# Patient Record
Sex: MALE | Race: WHITE | Employment: OTHER | URBAN - METROPOLITAN AREA
[De-identification: names, ages, dates, MRNs, and addresses within clinical notes are randomized per-mention and may not be internally consistent; named-entity substitution may affect disease eponyms.]

---

## 2017-12-15 RX ORDER — FINASTERIDE 5 MG/1
5 TABLET, FILM COATED ORAL DAILY
COMMUNITY
End: 2020-12-24

## 2018-01-04 ENCOUNTER — HOSPITAL ENCOUNTER (OUTPATIENT)
Dept: ENDOSCOPY | Age: 78
Discharge: HOME | End: 2018-01-04
Attending: INTERNAL MEDICINE | Admitting: INTERNAL MEDICINE

## 2018-01-04 RX ORDER — SODIUM CHLORIDE 0.9 % (FLUSH) 0.9 %
10 SYRINGE (ML) INJECTION PRN
Status: DISCONTINUED | OUTPATIENT
Start: 2018-01-04 | End: 2018-01-05 | Stop reason: HOSPADM

## 2018-01-04 RX ORDER — SODIUM CHLORIDE 9 MG/ML
INJECTION, SOLUTION INTRAVENOUS CONTINUOUS
Status: DISCONTINUED | OUTPATIENT
Start: 2018-01-04 | End: 2018-01-05 | Stop reason: HOSPADM

## 2018-01-04 RX ORDER — LIDOCAINE HYDROCHLORIDE 10 MG/ML
1 INJECTION, SOLUTION EPIDURAL; INFILTRATION; INTRACAUDAL; PERINEURAL
Status: ACTIVE | OUTPATIENT
Start: 2018-01-04 | End: 2018-01-04

## 2018-01-04 RX ORDER — SODIUM CHLORIDE 0.9 % (FLUSH) 0.9 %
10 SYRINGE (ML) INJECTION EVERY 12 HOURS SCHEDULED
Status: DISCONTINUED | OUTPATIENT
Start: 2018-01-04 | End: 2018-01-05 | Stop reason: HOSPADM

## 2018-01-04 NOTE — ANESTHESIA POST-OP
Anesthesia Post-op Note    Patient: Marina Guadalupe  MRN: [id number]  YOB: 1940  Date of evaluation: 1/4/2018  Time:  11:18 AM     Procedure(s) Performed: endo    Last Vitals: There were no vitals taken for this visit.     Dina Phase I:      Dina Phase II:      Anesthesia Post Evaluation    Final anesthesia type: TIVA  Patient location during evaluation: PACU  Patient participation: complete - patient participated  Level of consciousness: awake and alert  Airway patency: patent  Nausea & Vomiting: no vomiting  Complications: no  Cardiovascular status: hemodynamically stable  Respiratory status: acceptable  Hydration status: euvolemic        Brittni Lawrence MD  11:18 AM

## 2019-11-20 ENCOUNTER — HOSPITAL ENCOUNTER (OUTPATIENT)
Age: 79
Setting detail: OUTPATIENT SURGERY
Discharge: HOME | End: 2019-11-20
Attending: PLASTIC SURGERY | Admitting: PLASTIC SURGERY
Payer: MEDICARE

## 2019-11-20 ENCOUNTER — ANESTHESIA EVENT (OUTPATIENT)
Dept: OPERATING ROOM | Age: 79
End: 2019-11-20
Payer: MEDICARE

## 2019-11-20 ENCOUNTER — ANESTHESIA (OUTPATIENT)
Dept: OPERATING ROOM | Age: 79
End: 2019-11-20
Payer: MEDICARE

## 2019-11-20 VITALS
RESPIRATION RATE: 18 BRPM | HEART RATE: 61 BPM | DIASTOLIC BLOOD PRESSURE: 59 MMHG | BODY MASS INDEX: 31.88 KG/M2 | TEMPERATURE: 97.2 F | SYSTOLIC BLOOD PRESSURE: 122 MMHG | HEIGHT: 74 IN | OXYGEN SATURATION: 95 % | WEIGHT: 248.38 LBS

## 2019-11-20 VITALS — SYSTOLIC BLOOD PRESSURE: 124 MMHG | DIASTOLIC BLOOD PRESSURE: 59 MMHG | TEMPERATURE: 96.1 F | OXYGEN SATURATION: 100 %

## 2019-11-20 DIAGNOSIS — C44.311 BASAL CELL CARCINOMA OF NOSE: Primary | ICD-10-CM

## 2019-11-20 PROCEDURE — 6370000000 HC RX 637 (ALT 250 FOR IP): Performed by: PLASTIC SURGERY

## 2019-11-20 PROCEDURE — 7100000000 HC PACU RECOVERY - FIRST 15 MIN: Performed by: PLASTIC SURGERY

## 2019-11-20 PROCEDURE — 6360000002 HC RX W HCPCS: Performed by: PLASTIC SURGERY

## 2019-11-20 PROCEDURE — 88331 PATH CONSLTJ SURG 1 BLK 1SPC: CPT

## 2019-11-20 PROCEDURE — 3600000012 HC SURGERY LEVEL 2 ADDTL 15MIN: Performed by: PLASTIC SURGERY

## 2019-11-20 PROCEDURE — 7100000011 HC PHASE II RECOVERY - ADDTL 15 MIN: Performed by: PLASTIC SURGERY

## 2019-11-20 PROCEDURE — 6360000002 HC RX W HCPCS: Performed by: NURSE ANESTHETIST, CERTIFIED REGISTERED

## 2019-11-20 PROCEDURE — 2580000003 HC RX 258: Performed by: PLASTIC SURGERY

## 2019-11-20 PROCEDURE — 2709999900 HC NON-CHARGEABLE SUPPLY: Performed by: PLASTIC SURGERY

## 2019-11-20 PROCEDURE — 3700000001 HC ADD 15 MINUTES (ANESTHESIA): Performed by: PLASTIC SURGERY

## 2019-11-20 PROCEDURE — 3600000002 HC SURGERY LEVEL 2 BASE: Performed by: PLASTIC SURGERY

## 2019-11-20 PROCEDURE — 2500000003 HC RX 250 WO HCPCS: Performed by: PLASTIC SURGERY

## 2019-11-20 PROCEDURE — 88305 TISSUE EXAM BY PATHOLOGIST: CPT

## 2019-11-20 PROCEDURE — 7100000001 HC PACU RECOVERY - ADDTL 15 MIN: Performed by: PLASTIC SURGERY

## 2019-11-20 PROCEDURE — 7100000010 HC PHASE II RECOVERY - FIRST 15 MIN: Performed by: PLASTIC SURGERY

## 2019-11-20 PROCEDURE — 3700000000 HC ANESTHESIA ATTENDED CARE: Performed by: PLASTIC SURGERY

## 2019-11-20 PROCEDURE — 2500000003 HC RX 250 WO HCPCS: Performed by: NURSE ANESTHETIST, CERTIFIED REGISTERED

## 2019-11-20 RX ORDER — SODIUM CHLORIDE 0.9 % (FLUSH) 0.9 %
10 SYRINGE (ML) INJECTION EVERY 12 HOURS SCHEDULED
Status: CANCELLED | OUTPATIENT
Start: 2019-11-20

## 2019-11-20 RX ORDER — FENTANYL CITRATE 50 UG/ML
25 INJECTION, SOLUTION INTRAMUSCULAR; INTRAVENOUS EVERY 5 MIN PRN
Status: DISCONTINUED | OUTPATIENT
Start: 2019-11-20 | End: 2019-11-20 | Stop reason: HOSPADM

## 2019-11-20 RX ORDER — BALANCED SALT SOLUTION 6.4; .75; .48; .3; 3.9; 1.7 MG/ML; MG/ML; MG/ML; MG/ML; MG/ML; MG/ML
SOLUTION OPHTHALMIC
Status: COMPLETED | OUTPATIENT
Start: 2019-11-20 | End: 2019-11-20

## 2019-11-20 RX ORDER — LABETALOL HYDROCHLORIDE 5 MG/ML
5 INJECTION, SOLUTION INTRAVENOUS EVERY 10 MIN PRN
Status: DISCONTINUED | OUTPATIENT
Start: 2019-11-20 | End: 2019-11-20 | Stop reason: HOSPADM

## 2019-11-20 RX ORDER — CEFAZOLIN SODIUM 2 G/100ML
2 INJECTION, SOLUTION INTRAVENOUS
Status: COMPLETED | OUTPATIENT
Start: 2019-11-20 | End: 2019-11-20

## 2019-11-20 RX ORDER — FENTANYL CITRATE 50 UG/ML
50 INJECTION, SOLUTION INTRAMUSCULAR; INTRAVENOUS EVERY 5 MIN PRN
Status: DISCONTINUED | OUTPATIENT
Start: 2019-11-20 | End: 2019-11-20 | Stop reason: HOSPADM

## 2019-11-20 RX ORDER — HYDROMORPHONE HCL 110MG/55ML
0.25 PATIENT CONTROLLED ANALGESIA SYRINGE INTRAVENOUS EVERY 5 MIN PRN
Status: DISCONTINUED | OUTPATIENT
Start: 2019-11-20 | End: 2019-11-20 | Stop reason: HOSPADM

## 2019-11-20 RX ORDER — HYDROMORPHONE HCL 110MG/55ML
0.5 PATIENT CONTROLLED ANALGESIA SYRINGE INTRAVENOUS EVERY 5 MIN PRN
Status: DISCONTINUED | OUTPATIENT
Start: 2019-11-20 | End: 2019-11-20 | Stop reason: HOSPADM

## 2019-11-20 RX ORDER — SODIUM CHLORIDE, SODIUM LACTATE, POTASSIUM CHLORIDE, CALCIUM CHLORIDE 600; 310; 30; 20 MG/100ML; MG/100ML; MG/100ML; MG/100ML
INJECTION, SOLUTION INTRAVENOUS CONTINUOUS
Status: DISCONTINUED | OUTPATIENT
Start: 2019-11-20 | End: 2019-11-20 | Stop reason: HOSPADM

## 2019-11-20 RX ORDER — LIDOCAINE HYDROCHLORIDE AND EPINEPHRINE 10; 10 MG/ML; UG/ML
INJECTION, SOLUTION INFILTRATION; PERINEURAL
Status: COMPLETED | OUTPATIENT
Start: 2019-11-20 | End: 2019-11-20

## 2019-11-20 RX ORDER — PROPOFOL 10 MG/ML
INJECTION, EMULSION INTRAVENOUS PRN
Status: DISCONTINUED | OUTPATIENT
Start: 2019-11-20 | End: 2019-11-20 | Stop reason: SDUPTHER

## 2019-11-20 RX ORDER — LIDOCAINE HYDROCHLORIDE 10 MG/ML
0.5 INJECTION, SOLUTION EPIDURAL; INFILTRATION; INTRACAUDAL; PERINEURAL ONCE
Status: DISCONTINUED | OUTPATIENT
Start: 2019-11-20 | End: 2019-11-20 | Stop reason: HOSPADM

## 2019-11-20 RX ORDER — ONDANSETRON 2 MG/ML
4 INJECTION INTRAMUSCULAR; INTRAVENOUS
Status: DISCONTINUED | OUTPATIENT
Start: 2019-11-20 | End: 2019-11-20 | Stop reason: HOSPADM

## 2019-11-20 RX ORDER — HYDROCODONE BITARTRATE AND ACETAMINOPHEN 5; 325 MG/1; MG/1
1 TABLET ORAL EVERY 4 HOURS PRN
Qty: 20 TABLET | Refills: 0 | Status: SHIPPED | OUTPATIENT
Start: 2019-11-20 | End: 2019-11-27

## 2019-11-20 RX ORDER — LIDOCAINE HYDROCHLORIDE 20 MG/ML
INJECTION, SOLUTION EPIDURAL; INFILTRATION; INTRACAUDAL; PERINEURAL PRN
Status: DISCONTINUED | OUTPATIENT
Start: 2019-11-20 | End: 2019-11-20 | Stop reason: SDUPTHER

## 2019-11-20 RX ORDER — PROPOFOL 10 MG/ML
INJECTION, EMULSION INTRAVENOUS CONTINUOUS PRN
Status: DISCONTINUED | OUTPATIENT
Start: 2019-11-20 | End: 2019-11-20 | Stop reason: SDUPTHER

## 2019-11-20 RX ORDER — SODIUM CHLORIDE, SODIUM LACTATE, POTASSIUM CHLORIDE, CALCIUM CHLORIDE 600; 310; 30; 20 MG/100ML; MG/100ML; MG/100ML; MG/100ML
INJECTION, SOLUTION INTRAVENOUS CONTINUOUS
Status: CANCELLED | OUTPATIENT
Start: 2019-11-20

## 2019-11-20 RX ORDER — SODIUM CHLORIDE 9 MG/ML
INJECTION, SOLUTION INTRAVENOUS CONTINUOUS
Status: CANCELLED | OUTPATIENT
Start: 2019-11-20

## 2019-11-20 RX ORDER — SODIUM CHLORIDE 0.9 % (FLUSH) 0.9 %
10 SYRINGE (ML) INJECTION PRN
Status: CANCELLED | OUTPATIENT
Start: 2019-11-20

## 2019-11-20 RX ADMIN — PROPOFOL 10 MG: 10 INJECTION, EMULSION INTRAVENOUS at 11:03

## 2019-11-20 RX ADMIN — PROPOFOL 100 MCG/KG/MIN: 10 INJECTION, EMULSION INTRAVENOUS at 10:52

## 2019-11-20 RX ADMIN — SODIUM CHLORIDE, POTASSIUM CHLORIDE, SODIUM LACTATE AND CALCIUM CHLORIDE: 600; 310; 30; 20 INJECTION, SOLUTION INTRAVENOUS at 09:55

## 2019-11-20 RX ADMIN — LIDOCAINE HYDROCHLORIDE 60 MG: 20 INJECTION, SOLUTION EPIDURAL; INFILTRATION; INTRACAUDAL; PERINEURAL at 10:52

## 2019-11-20 RX ADMIN — PROPOFOL 50 MG: 10 INJECTION, EMULSION INTRAVENOUS at 10:52

## 2019-11-20 RX ADMIN — PROPOFOL 20 MG: 10 INJECTION, EMULSION INTRAVENOUS at 10:59

## 2019-11-20 RX ADMIN — PROPOFOL 20 MG: 10 INJECTION, EMULSION INTRAVENOUS at 10:55

## 2019-11-20 RX ADMIN — CEFAZOLIN SODIUM 2 G: 2 INJECTION, SOLUTION INTRAVENOUS at 10:41

## 2019-11-20 ASSESSMENT — PAIN - FUNCTIONAL ASSESSMENT: PAIN_FUNCTIONAL_ASSESSMENT: 0-10

## 2019-11-20 ASSESSMENT — PULMONARY FUNCTION TESTS
PIF_VALUE: 0
PIF_VALUE: 1
PIF_VALUE: 0
PIF_VALUE: 1
PIF_VALUE: 0
PIF_VALUE: 1
PIF_VALUE: 1
PIF_VALUE: 0
PIF_VALUE: 1
PIF_VALUE: 1
PIF_VALUE: 0
PIF_VALUE: 0
PIF_VALUE: 1
PIF_VALUE: 0
PIF_VALUE: 0
PIF_VALUE: 1
PIF_VALUE: 1
PIF_VALUE: 0
PIF_VALUE: 1
PIF_VALUE: 0

## 2020-12-22 NOTE — H&P
[address]                     [facility], [address]                       PREOPERATIVE HISTORY AND PHYSICAL    PATIENT NAME: Nancy Quan                   :        1940  MED REC NO:   [id number]                          ROOM:  ACCOUNT NO:   [id number]                           ADMIT DATE: 2020  PROVIDER:     Madeleine Kenyon MD    DATE OF PLANNED PROCEDURE:  2020    DIAGNOSIS:  Squamous cell carcinoma, right ear. PLANNED PROCEDURE:  Wedge excision, frozen section, and possible full  thickness skin graft. INDICATION:  A 51-year-old black male presented with an obvious squamous  cell carcinoma of the right ear. After discussing options at length,  elected to proceed with definitive excision, frozen section repair with  a full thickness skin graft. The issue of bleeding, infection, scarring  as well as the issue of recurrence and total or partial loss of graft  itself was discussed and consent was obtained. PAST MEDICAL HISTORY:  Significant for high blood pressure. MEDICATIONS:  Currently takes omeprazole and a statin. ALLERGIES:  Denies any drug allergies. PHYSICAL EXAMINATION:  GENERAL:  Reveals a pleasant male, in no apparent distress. VITAL SIGNS:  Stable. LUNGS:  Clear. HEART:  Regular rate and rhythm. Examination again reveals the obvious 1 cm squamous cell carcinoma of  the right ear without any cervical adenopathy. IMPRESSION AND PLAN:  The patient will undergo definitive excision. Again the issue of bleeding, infection, and scarring as well as the  issue of recurrence and total or partial loss of graft itself was  discussed and consent was obtained.       Ronald Parks MD    D: 2020 17:14:36       T: 2020 21:45:25     [id number]  Job#: [id number]     Doc#: [id number]

## 2020-12-23 ENCOUNTER — OFFICE VISIT (OUTPATIENT)
Dept: PRIMARY CARE CLINIC | Age: 80
End: 2020-12-23
Payer: MEDICARE

## 2020-12-23 PROCEDURE — G8421 BMI NOT CALCULATED: HCPCS | Performed by: NURSE PRACTITIONER

## 2020-12-23 PROCEDURE — G8428 CUR MEDS NOT DOCUMENT: HCPCS | Performed by: NURSE PRACTITIONER

## 2020-12-23 PROCEDURE — 99211 OFF/OP EST MAY X REQ PHY/QHP: CPT | Performed by: NURSE PRACTITIONER

## 2020-12-23 NOTE — PATIENT INSTRUCTIONS

## 2020-12-24 NOTE — PROGRESS NOTES
Name_______________________________________Printed:____________________  Date and time of surgery____12/29/2020   1400____________________Arrival Time:____1230   [facility]____________   1. The instructions given regarding when and if a patient needs to stop oral intake prior to surgery varies. Follow the specific instructions you were given                  _X__Nothing to eat or to drink after Midnight the night before.                             ____Endoscopy patient follow your DRS instructions-generally you will be doing a part of the prep after Midnight                   ____Carbo loading or ERAS instructions will be given to select patients-if you have been given those instructions -please do the following                           The evening before your surgery after dinner before midnight drink 40 ounces of gatorade. If you are diabetic use sugar free. The morning of surgery drink 40 ounces of water. This needs to be finished 3 hours prior to your surgery start time. 2. Take the following pills with a small sip of water on the morning of surgery____AMLODIPINE, OMEPRAZOLE______________________________________________                  Do not take blood pressure medications ending in pril or sartan the corbin prior to surgery or the morning of surgery_   3. Aspirin, Ibuprofen, Advil, Naproxen, Vitamin E and other Anti-inflammatory products and supplements should be stopped for 5 -7days before surgery or as directed by your physician. 4. Check with your Doctor regarding stopping Plavix, Coumadin,Eliquis, Lovenox,Effient,Pradaxa,Xarelto, Fragmin or other blood thinners and follow their instructions. 5. Do not smoke, and do not drink any alcoholic beverages 24 hours prior to surgery. This includes NA Beer. Refrain from the usage of any recreational drugs. 6. You may brush your teeth and gargle the morning of surgery. DO NOT SWALLOW WATER   7.  You MUST make arrangements for a responsible adult to stay on site while you are here and take you home after your surgery. You will not be allowed to leave alone or drive yourself home. It is strongly suggested someone stay with you the first 24 hrs. Your surgery will be cancelled if you do not have a ride home. 8. A parent/legal guardian must accompany a child scheduled for surgery and plan to stay at the hospital until the child is discharged. Please do not bring other children with you. 9. Please wear simple, loose fitting clothing to the hospital.  [place] not bring valuables (money, credit cards, checkbooks, etc.) Do not wear any makeup (including no eye makeup) or nail polish on your fingers or toes. 10. DO NOT wear any jewelry or piercings on day of surgery. All body piercing jewelry must be removed. 11. If you have ___dentures, they will be removed before going to the OR; we will provide you a container. If you wear ___contact lenses or ___glasses, they will be removed; please bring a case for them. 12. Please see your family doctor/pediatrician for a history & physical and/or concerning medications. Bring any test results/reports from your physician's office. PCP__________________Phone___________H&P Appt. Date________             13 If you  have a Living Will and Durable Power of  for Healthcare, please bring in a copy. 15. Notify your Surgeon if you develop any illness between now and surgery  time, cough, cold, fever, sore throat, nausea, vomiting, etc.  Please notify your surgeon if you experience dizziness, shortness of breath or blurred vision between now & the time of your surgery             15. DO NOT shave your operative site 96 hours prior to surgery. For face & neck surgery, men may use an electric razor 48 hours prior to surgery. 16. Shower the night before or morning of surgery using an antibacterial soap or as you have been instructed.              17. To provide excellent care visitors will be limited to one in the room at any given time. 18.  Please bring picture ID and insurance card. 19.  Visit our web site for additional information:  [email or web address]              20.During flu season no children under the age of 15 are permitted in the hospital for the safety of all patients. 21. If you take a long acting insulin in the evening only  take half of your usual  dose the night  before your procedure              22. If you use a c-pap please bring DOS if staying overnight,             23.For your convenience [facility] has a pharmacy on site to fill your prescriptions. 24. If you use oxygen and have a portable tank please bring it  with you the DOS             25. Bring a complete list of all your medications with name and dose include any supplements. 26. Other__________________________________________   *Please call pre admission testing if you any further questions   [address]. [facility]  [phone]   [address] [place] [address]       All above information reviewed with patient in person or by phone. Patient verbalizes understanding. All questions and concerns addressed. Patient/Rep____PATIENT________________                                                                                                                                  There is a one visitor policy at [facility] for all surgeries and endoscopies. Whether the visitor can stay or will be asked to wait in the car will depend on the current policy and if social distancing can be maintained. The policy is subject to change at any time. Please make sure the visitor has a cell phone that is on,charged and able to accept calls, as this may be the way that the staff communicates with them. Pain management is NO VISITOR policyThe patients ride is expected to remain in the car with a cell phone for communication. If the ride is leaving the hospital grounds please make sure they are back in time for pickup. Have the patient inform the staff on arrival what their rides plans are while the patient is in the facility. At the MAIN there is one visitor allowed. Please note that the visitor policy is subject to change.   PRE OP INSTRUCTIONS

## 2020-12-25 LAB — SARS-COV-2, NAA: NOT DETECTED

## 2020-12-29 ENCOUNTER — ANESTHESIA EVENT (OUTPATIENT)
Dept: OPERATING ROOM | Age: 80
End: 2020-12-29
Payer: MEDICARE

## 2020-12-29 ENCOUNTER — HOSPITAL ENCOUNTER (OUTPATIENT)
Age: 80
Setting detail: OUTPATIENT SURGERY
Discharge: HOME | End: 2020-12-29
Attending: PLASTIC SURGERY | Admitting: PLASTIC SURGERY
Payer: MEDICARE

## 2020-12-29 ENCOUNTER — ANESTHESIA (OUTPATIENT)
Dept: OPERATING ROOM | Age: 80
End: 2020-12-29
Payer: MEDICARE

## 2020-12-29 VITALS
DIASTOLIC BLOOD PRESSURE: 66 MMHG | BODY MASS INDEX: 32.73 KG/M2 | OXYGEN SATURATION: 97 % | SYSTOLIC BLOOD PRESSURE: 129 MMHG | HEIGHT: 74 IN | HEART RATE: 69 BPM | RESPIRATION RATE: 12 BRPM | WEIGHT: 255 LBS | TEMPERATURE: 98.6 F

## 2020-12-29 VITALS
RESPIRATION RATE: 1 BRPM | SYSTOLIC BLOOD PRESSURE: 127 MMHG | DIASTOLIC BLOOD PRESSURE: 56 MMHG | OXYGEN SATURATION: 96 %

## 2020-12-29 PROCEDURE — 88305 TISSUE EXAM BY PATHOLOGIST: CPT

## 2020-12-29 PROCEDURE — 3700000001 HC ADD 15 MINUTES (ANESTHESIA): Performed by: PLASTIC SURGERY

## 2020-12-29 PROCEDURE — 7100000010 HC PHASE II RECOVERY - FIRST 15 MIN: Performed by: PLASTIC SURGERY

## 2020-12-29 PROCEDURE — 7100000011 HC PHASE II RECOVERY - ADDTL 15 MIN: Performed by: PLASTIC SURGERY

## 2020-12-29 PROCEDURE — 6370000000 HC RX 637 (ALT 250 FOR IP): Performed by: PLASTIC SURGERY

## 2020-12-29 PROCEDURE — 6360000002 HC RX W HCPCS: Performed by: NURSE ANESTHETIST, CERTIFIED REGISTERED

## 2020-12-29 PROCEDURE — 3700000000 HC ANESTHESIA ATTENDED CARE: Performed by: PLASTIC SURGERY

## 2020-12-29 PROCEDURE — 3600000012 HC SURGERY LEVEL 2 ADDTL 15MIN: Performed by: PLASTIC SURGERY

## 2020-12-29 PROCEDURE — 88331 PATH CONSLTJ SURG 1 BLK 1SPC: CPT

## 2020-12-29 PROCEDURE — 3600000002 HC SURGERY LEVEL 2 BASE: Performed by: PLASTIC SURGERY

## 2020-12-29 PROCEDURE — 2500000003 HC RX 250 WO HCPCS: Performed by: NURSE ANESTHETIST, CERTIFIED REGISTERED

## 2020-12-29 PROCEDURE — 2580000003 HC RX 258: Performed by: PLASTIC SURGERY

## 2020-12-29 PROCEDURE — 6360000002 HC RX W HCPCS: Performed by: PLASTIC SURGERY

## 2020-12-29 PROCEDURE — 2709999900 HC NON-CHARGEABLE SUPPLY: Performed by: PLASTIC SURGERY

## 2020-12-29 PROCEDURE — 2500000003 HC RX 250 WO HCPCS: Performed by: PLASTIC SURGERY

## 2020-12-29 RX ORDER — PROPOFOL 10 MG/ML
INJECTION, EMULSION INTRAVENOUS PRN
Status: DISCONTINUED | OUTPATIENT
Start: 2020-12-29 | End: 2020-12-29 | Stop reason: SDUPTHER

## 2020-12-29 RX ORDER — ONDANSETRON 2 MG/ML
4 INJECTION INTRAMUSCULAR; INTRAVENOUS
Status: DISCONTINUED | OUTPATIENT
Start: 2020-12-29 | End: 2020-12-29 | Stop reason: HOSPADM

## 2020-12-29 RX ORDER — HYDROCODONE BITARTRATE AND ACETAMINOPHEN 5; 325 MG/1; MG/1
1 TABLET ORAL
Status: DISCONTINUED | OUTPATIENT
Start: 2020-12-29 | End: 2020-12-29 | Stop reason: HOSPADM

## 2020-12-29 RX ORDER — LIDOCAINE HYDROCHLORIDE 10 MG/ML
1 INJECTION, SOLUTION EPIDURAL; INFILTRATION; INTRACAUDAL; PERINEURAL
Status: CANCELLED | OUTPATIENT
Start: 2020-12-29 | End: 2020-12-29

## 2020-12-29 RX ORDER — LIDOCAINE HYDROCHLORIDE AND EPINEPHRINE 10; 10 MG/ML; UG/ML
INJECTION, SOLUTION INFILTRATION; PERINEURAL
Status: COMPLETED | OUTPATIENT
Start: 2020-12-29 | End: 2020-12-29

## 2020-12-29 RX ORDER — MINERAL OIL 471.99 G/472ML
OIL TOPICAL
Status: COMPLETED | OUTPATIENT
Start: 2020-12-29 | End: 2020-12-29

## 2020-12-29 RX ORDER — HYDROCODONE BITARTRATE AND ACETAMINOPHEN 5; 325 MG/1; MG/1
1 TABLET ORAL EVERY 4 HOURS PRN
Qty: 20 TABLET | Refills: 0 | Status: SHIPPED | OUTPATIENT
Start: 2020-12-29 | End: 2021-01-05

## 2020-12-29 RX ORDER — SODIUM CHLORIDE 9 MG/ML
INJECTION, SOLUTION INTRAVENOUS CONTINUOUS
Status: CANCELLED | OUTPATIENT
Start: 2020-12-29

## 2020-12-29 RX ORDER — HYDROMORPHONE HCL 110MG/55ML
0.25 PATIENT CONTROLLED ANALGESIA SYRINGE INTRAVENOUS EVERY 5 MIN PRN
Status: DISCONTINUED | OUTPATIENT
Start: 2020-12-29 | End: 2020-12-29 | Stop reason: HOSPADM

## 2020-12-29 RX ORDER — CEPHALEXIN 500 MG/1
500 CAPSULE ORAL 4 TIMES DAILY
Qty: 28 CAPSULE | Refills: 0 | Status: SHIPPED | OUTPATIENT
Start: 2020-12-29 | End: 2021-01-05

## 2020-12-29 RX ORDER — LIDOCAINE HYDROCHLORIDE 20 MG/ML
INJECTION, SOLUTION INFILTRATION; PERINEURAL PRN
Status: DISCONTINUED | OUTPATIENT
Start: 2020-12-29 | End: 2020-12-29 | Stop reason: SDUPTHER

## 2020-12-29 RX ORDER — LIDOCAINE HYDROCHLORIDE 10 MG/ML
0.5 INJECTION, SOLUTION EPIDURAL; INFILTRATION; INTRACAUDAL; PERINEURAL ONCE
Status: DISCONTINUED | OUTPATIENT
Start: 2020-12-29 | End: 2020-12-29 | Stop reason: HOSPADM

## 2020-12-29 RX ORDER — SODIUM CHLORIDE, SODIUM LACTATE, POTASSIUM CHLORIDE, CALCIUM CHLORIDE 600; 310; 30; 20 MG/100ML; MG/100ML; MG/100ML; MG/100ML
INJECTION, SOLUTION INTRAVENOUS CONTINUOUS
Status: DISCONTINUED | OUTPATIENT
Start: 2020-12-29 | End: 2020-12-29 | Stop reason: HOSPADM

## 2020-12-29 RX ADMIN — LIDOCAINE HYDROCHLORIDE 100 MG: 20 INJECTION, SOLUTION INFILTRATION; PERINEURAL at 13:52

## 2020-12-29 RX ADMIN — SODIUM CHLORIDE, POTASSIUM CHLORIDE, SODIUM LACTATE AND CALCIUM CHLORIDE: 600; 310; 30; 20 INJECTION, SOLUTION INTRAVENOUS at 13:25

## 2020-12-29 RX ADMIN — CEFAZOLIN SODIUM 2 G: 10 INJECTION, POWDER, FOR SOLUTION INTRAVENOUS at 13:47

## 2020-12-29 RX ADMIN — PROPOFOL 60 MG: 10 INJECTION, EMULSION INTRAVENOUS at 13:53

## 2020-12-29 RX ADMIN — PROPOFOL 20 MG: 10 INJECTION, EMULSION INTRAVENOUS at 13:59

## 2020-12-29 RX ADMIN — PROPOFOL 20 MG: 10 INJECTION, EMULSION INTRAVENOUS at 14:13

## 2020-12-29 ASSESSMENT — PULMONARY FUNCTION TESTS
PIF_VALUE: 1
PIF_VALUE: 0
PIF_VALUE: 1
PIF_VALUE: 0
PIF_VALUE: 1
PIF_VALUE: 0
PIF_VALUE: 1
PIF_VALUE: 0
PIF_VALUE: 0
PIF_VALUE: 1
PIF_VALUE: 0
PIF_VALUE: 1
PIF_VALUE: 0
PIF_VALUE: 0
PIF_VALUE: 1
PIF_VALUE: 0
PIF_VALUE: 1
PIF_VALUE: 1
PIF_VALUE: 0
PIF_VALUE: 0
PIF_VALUE: 1
PIF_VALUE: 0
PIF_VALUE: 1
PIF_VALUE: 0
PIF_VALUE: 1
PIF_VALUE: 0
PIF_VALUE: 1
PIF_VALUE: 0
PIF_VALUE: 1
PIF_VALUE: 0
PIF_VALUE: 1
PIF_VALUE: 0
PIF_VALUE: 0
PIF_VALUE: 1

## 2020-12-29 ASSESSMENT — PAIN - FUNCTIONAL ASSESSMENT: PAIN_FUNCTIONAL_ASSESSMENT: 0-10

## 2020-12-29 NOTE — H&P
I have reviewed the history and physical and examined the patient and find no relevant changes. I have reviewed with the patient and/or family the risks, benefits, and alternatives to the procedure. Patient has no known allergies. Last recorded vitals:  BP (!) 143/67   Pulse 77   Temp 97.5 °F (36.4 °C) (Temporal)   Resp 16   Ht 6' 2\" (1.88 m)   Wt 255 lb (115.7 kg)   SpO2 97%   BMI 32.74 kg/m²     Past Surgical History:   Procedure Laterality Date    ANKLE FUSION Right 2012    CYSTOSCOPY      MULTIPLE FOR BLADDER CA.   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  Authorizing Provider   amLODIPine (NORVASC) 10 MG tablet Take 10 mg by mouth daily  12/26/14  Yes Historical Provider, MD   atorvastatin (LIPITOR) 20 MG tablet Take 5 mg by mouth nightly  12/12/14  Yes Historical Provider, MD   omeprazole (PRILOSEC) 20 MG capsule Take 20 mg by mouth 2 times daily  2/5/15  Yes Historical Provider, MD   torsemide (DEMADEX) 5 MG tablet Take 5 mg by mouth daily  1/19/15  Yes Historical Provider, MD   calcium carbonate 600 MG TABS tablet Take 1 tablet by mouth 2 times daily as needed    Yes Historical Provider, MD   B Complex Vitamins (VITAMIN B COMPLEX PO) Take by mouth daily    Yes Historical Provider, MD         Current Facility-Administered Medications:     lactated ringers infusion, , Intravenous, Continuous, Armaan Mendoza MD, Last Rate: 50 mL/hr at 12/29/20 1325, New Bag at 12/29/20 1325    lidocaine PF 1 % injection 0.5 mL, 0.5 mL, Intradermal, Once, Ishmael Rome MD    ceFAZolin (ANCEF) 2 g in dextrose 5 % 100 mL IVPB, 2 g, Intravenous, On Call to MD Karen Rutherford MD  12/29/2020

## 2020-12-29 NOTE — ANESTHESIA POSTPROCEDURE EVALUATION
Department of Anesthesiology  Postprocedure Note    Patient: Franklin Saldivar  MRN: [id number]  YOB: 1940  Date of evaluation: 12/29/2020  Time:  4:20 PM     Procedure Summary     Date: 12/29/20 Room / Location: [address]    Anesthesia Start: 1393 Anesthesia Stop: 3065    Procedure: WIDE EXCISION SQUAMOUS CELL CARCINOMA RIGHT EAR; FROZEN SECTION, FULL THICKNESS SKINGRAFT (Right Ear) Diagnosis: (C44.212  SQUAMOUS CELL CARCINOMA RIGHT EAR)    Surgeons: Irlanda Hernandez MD Responsible Provider: Jim Soto MD    Anesthesia Type: MAC ASA Status: 3          Anesthesia Type: MAC    Dina Phase I:      Dina Phase II: Dina Score: 10    Last vitals: Reviewed and per EMR flowsheets.        Anesthesia Post Evaluation    Patient location during evaluation: PACU  Patient participation: complete - patient participated  Level of consciousness: awake  Airway patency: patent  Nausea & Vomiting: no vomiting  Complications: no  Cardiovascular status: hemodynamically stable  Respiratory status: acceptable  Hydration status: euvolemic

## 2020-12-29 NOTE — PROGRESS NOTES
Pt arrived from OR, pt awake and alert/ talking, pt in phase 2 upon arrival to pacu. VSS. Respirations even unlabored. Right ear dressing clean dry and intact.

## 2020-12-29 NOTE — ANESTHESIA PRE PROCEDURE
Department of Anesthesiology  Preprocedure Note       Name:  Mino Burr   Age:  [de-identified] y.o.  :  1940                                          MRN:  [id number]         Date:  2020      Surgeon: Leta Palafox): Leatha Zee MD    Procedure: Procedure(s):  WIDE EXCISION SQUAMOUS CELL CARCINOMA RIGHT EAR; FROZEN SECTION  FULL THICKNESS SKIN GRAFT    Medications prior to admission:   Prior to Admission medications    Medication Sig Start Date End Date Taking? Authorizing Provider   HYDROcodone-acetaminophen (NORCO) 5-325 MG per tablet Take 1 tablet by mouth every 4 hours as needed for Pain for up to 7 days. Intended supply: 3 days.  Take lowest dose possible to manage pain 20 Yes Leatha Zee MD   cephALEXin (KEFLEX) 500 MG capsule Take 1 capsule by mouth 4 times daily for 7 days 20 Yes Leatha Zee MD   amLODIPine (NORVASC) 10 MG tablet Take 10 mg by mouth daily  14  Yes Historical Provider, MD   atorvastatin (LIPITOR) 20 MG tablet Take 5 mg by mouth nightly  14  Yes Historical Provider, MD   omeprazole (PRILOSEC) 20 MG capsule Take 20 mg by mouth 2 times daily  2/5/15  Yes Historical Provider, MD   torsemide (DEMADEX) 5 MG tablet Take 5 mg by mouth daily  1/19/15  Yes Historical Provider, MD   calcium carbonate 600 MG TABS tablet Take 1 tablet by mouth 2 times daily as needed    Yes Historical Provider, MD   B Complex Vitamins (VITAMIN B COMPLEX PO) Take by mouth daily    Yes Historical Provider, MD       Current medications:    Current Facility-Administered Medications   Medication Dose Route Frequency Provider Last Rate Last Admin    lactated ringers infusion   Intravenous Continuous Armaan Mendoza MD 50 mL/hr at 20 1325 New Bag at 20 1325    lidocaine PF 1 % injection 0.5 mL  0.5 mL Intradermal Once Armaan Mendoza MD        ceFAZolin (ANCEF) 2 g in dextrose 5 % 100 mL IVPB  2 g Intravenous On Call to [address],  mL/hr at 20 1347 2 g at 20 1347    HYDROcodone-acetaminophen (NORCO) 5-325 MG per tablet 1 tablet  1 tablet Oral Once PRN Ana Gardner MD        ondansetron [facility]) injection 4 mg  4 mg Intravenous Once PRN Ana Gardner MD        HYDROmorphone (DILAUDID) injection 0.25 mg  0.25 mg Intravenous Q5 Min PRN Ana Gardner MD           Allergies:  No Known Allergies    Problem List:    Patient Active Problem List   Diagnosis Code    Peripheral neuropathy G62.9       Past Medical History:        Diagnosis Date    Tracey esophagus     Bladder cancer ([facility] [address] 2010    DJD (degenerative joint disease)     HTN (hypertension)     Hyperlipidemia     Neuropathy 01/2015       Past Surgical History:        Procedure Laterality Date    ANKLE FUSION Right 2012    CYSTOSCOPY      MULTIPLE FOR BLADDER CA.    FACIAL COSMETIC SURGERY Right 11/20/2019    EXCISION BASAL CELL CARCINOMA RIGHT CHEEK performed [facility] Fly Benito [facility] at [facility]      NOSE SURGERY N/A 11/20/2019    EXCISION BASAL CELL CARCINOMA FROM NASAL TIP WITH FROZEN SECTION, BILOBED FLAP performed by Fly Benito MD at [address] [place] [address]       Social History:    Social History     Tobacco Use    Smoking status: Never Smoker    Smokeless tobacco: Never Used   Substance Use Topics    Alcohol use:  No                                Counseling given: Not Answered      Vital Signs (Current):   Vitals:    12/24/20 0859 12/29/20 1256 12/29/20 1258   BP:   (!) 143/67   Pulse:   77   Resp:   16   Temp:   97.5 °F (36.4 °C)   TempSrc:   Temporal   SpO2:   97%   Weight: 255 lb (115.7 kg) 255 lb (115.7 kg)    Height: 6' 2\" (1.88 m) 6' 2\" (1.88 m)                                               BP Readings from Last 3 Encounters:   12/29/20 (!) 143/67   11/20/19 (!) 122/59   11/20/19 (!) 124/59       NPO Status: Time of last liquid consumption: 2330                        Time of last solid consumption: 2330 Date of last liquid consumption: 12/28/20                        Date of last solid food consumption: 12/28/20    BMI:   Wt Readings from Last 3 Encounters:   12/29/20 255 lb (115.7 kg)   11/20/19 248 lb 6 oz (112.7 kg)   12/15/17 250 lb (113.4 kg)     Body mass index is 32.74 kg/m². CBC: No results found for: WBC, RBC, HGB, HCT, MCV, RDW, PLT    CMP:   Lab Results   Component Value Date    PROT 6.7 03/06/2015       POC Tests: No results for input(s): POCGLU, POCNA, POCK, POCCL, POCBUN, POCHEMO, POCHCT in the last 72 hours. Coags: No results found for: PROTIME, INR, APTT    HCG (If Applicable): No results found for: PREGTESTUR, PREGSERUM, HCG, HCGQUANT     ABGs: No results found for: PHART, PO2ART, USQ9XUJ, VQV9UKC, BEART, D2VYGVJI     Type & Screen (If Applicable):  No results found for: LABABO, LABRH    Drug/Infectious Status (If Applicable):  No results found for: HIV, HEPCAB    COVID-19 Screening (If Applicable):   Lab Results   Component Value Date    COVID19 NOT DETECTED 12/23/2020         Anesthesia Evaluation  Patient summary reviewed no history of anesthetic complications:   Airway: Mallampati: II  TM distance: >3 FB   Neck ROM: full  Mouth opening: > = 3 FB Dental: normal exam         Pulmonary: breath sounds clear to auscultation                             Cardiovascular:    (+) hypertension:, hyperlipidemia        Rhythm: regular  Rate: normal                    Neuro/Psych:   (+) neuromuscular disease:,              ROS comment: neuropathy GI/Hepatic/Renal:   (+) GERD (no gerd sx today or recently): well controlled,           Endo/Other:    (+) malignancy/cancer (bladder). Abdominal:           Vascular:                                        Anesthesia Plan      MAC     ASA 3     (Patient verbalizes understanding that there is the possibility of awareness and recall with MAC.   Patient verbalizes a desire to proceed with the planned anesthetic.)  Induction: intravenous. Anesthetic plan and risks discussed with patient. Plan discussed with CRNA.                   Karen Murphy MD   12/29/2020

## 2020-12-30 NOTE — OP NOTE
[facility]                     [facility], [address] [place] [address]                                OPERATIVE REPORT    PATIENT NAME: Ayde Suero                   :        1940  MED REC NO:   [id number]                          ROOM:  ACCOUNT NO:   [id number]                           ADMIT DATE: 2020  PROVIDER:     Willam Chirinos MD    DATE OF PROCEDURE:  2020    PREOPERATIVE DIAGNOSIS:  Squamous cell carcinoma, right helix. POSTOPERATIVE DIAGNOSIS:  Squamous cell carcinoma, right helix. OPERATION PERFORMED:  Excision of 1 cm of squamous cell carcinoma and  repair with a full-thickness skin graft. SURGEON:  Willam Chirinos MD    INDICATIONS:  This is an 25-year-old male, who presented in consultation  for an obvious squamous cell carcinoma. After once again discussing the  options at length, he elected to proceed with definitive excision,  frozen section repair using a full-thickness skin graft. Issue of  bleeding, infection, and scarring as well as the issue of recurrence and  total and partial loss of graft itself was discussed, and consent was  obtained. OPERATIVE PROCEDURE:  The patient was taken to the operating room on  , placed in supine position, at which time IV sedation provided. The area was infiltrated with 1% lidocaine with epinephrine 1:100,000,  scrubbed with some Betadine solution. The lesion was then excised and  the margins were positive on the deep margins and therefore I did  complete circumferential reexcision down to the underlying cartilage. I  tried to leave as much as perichondrium as possible. Hemostasis was  obtained with electrocautery. I did place a suture on the deep margins  for clarification. I then obtained a full-thickness skin graft from the  postauricular area. The donor site was closed after hemostasis using  5-0 chromic.     Skin graft was appropriately defatted, placed on the recipient site  using 5 Vicryl and a bolster dressing made of Xeroform and cotton balls  soaked in mineral oil was then placed and secured with a 5-0 Vicryl. The patient was then taken to the recovery room in satisfactory  condition. No complication were noted. At the end of the procedure,  sponge, needle, and instruments counts were entirely correct. Instructed to keep the head elevated all the times. Followup in one  week. I gave him script for Norco as well as Keflex.         Milana Velasquez MD    D: 12/29/2020 17:25:20       T: 12/29/2020 22:38:03     [id number]  Job#: [id number]     Doc#: [id number]    CC:

## 2022-10-07 NOTE — PROGRESS NOTES
Name_______________________________________Printed:____________________  Date and time of surgery____10/12/2022    0745____________________Arrival Time:_____0615   [facility] [place]___________   1. The instructions given regarding when and if a patient needs to stop oral intake prior to surgery varies. Follow the specific instructions you were given                  __X_Nothing to eat or to drink after Midnight the night before.                   ____Carbo loading or ERAS instructions will be given to select patients-if you have been given those instructions -please do the following                           The evening before your surgery after dinner before midnight drink 40 ounces of gatorade. If you are diabetic use sugar free. The morning of surgery drink 40 ounces of water. This needs to be finished 3 hours prior to your surgery start time. 2. Take the following pills with a small sip of water on the morning of surgery___AMLODIPINE, OMEPRAZOLE________________________________________________                  Do not take blood pressure medications ending in pril or sartan the corbin prior to surgery or the morning of surgery_   3. Aspirin, Ibuprofen, Advil, Naproxen, Vitamin E and other Anti-inflammatory products and supplements should be stopped for 5 -7days before surgery or as directed by your physician. 4. Check with your Doctor regarding stopping Plavix, Coumadin,Eliquis, Lovenox,Effient,Pradaxa,Xarelto, Fragmin or other blood thinners and follow their instructions. 5. Do not smoke, and do not drink any alcoholic beverages 24 hours prior to surgery. This includes NA Beer. Refrain from the usage of any recreational drugs. 6. You may brush your teeth and gargle the morning of surgery. DO NOT SWALLOW WATER   7. You MUST make arrangements for a responsible adult to stay on site while you are here and take you home after your surgery. You will not be allowed to leave alone or drive yourself home.   It is strongly suggested someone stay with you the first 24 hrs. Your surgery will be cancelled if you do not have a ride home. 8. A parent/legal guardian must accompany a child scheduled for surgery and plan to stay at the hospital until the child is discharged. Please do not bring other children with you. 9. Please wear simple, loose fitting clothing to the hospital.  Chad Puri not bring valuables (money, credit cards, checkbooks, etc.) Do not wear any makeup (including no eye makeup) or nail polish on your fingers or toes. 10. DO NOT wear any jewelry or piercings on day of surgery. All body piercing jewelry must be removed. 11. If you have ___dentures, they will be removed before going to the OR; we will provide you a container. If you wear ___contact lenses or ___glasses, they will be removed; please bring a case for them. 12. Please see your family doctor/pediatrician for a history & physical and/or concerning medications. Bring any test results/reports from your physician's office. PCP__________________Phone___________H&P Appt. Date________             13 If you  have a Living Will and Durable Power of  for Healthcare, please bring in a copy. 15. Notify your Surgeon if you develop any illness between now and surgery  time, cough, cold, fever, sore throat, nausea, vomiting, etc.  Please notify your surgeon if you experience dizziness, shortness of breath or blurred vision between now & the time of your surgery             15. DO NOT shave your operative site 96 hours prior to surgery. For face & neck surgery, men may use an electric razor 48 hours prior to surgery. 16. Shower the night before or morning of surgery using an antibacterial soap or as you have been instructed. 17. To provide excellent care visitors will be limited to one in the room at any given time. 18.  Please bring picture ID and insurance card.              19. Visit our web site for additional information:  [email or web address]-eprep              20.During flu season no children under the age of 15 are permitted in the hospital for the safety of all patients. 21. If you take a long acting insulin in the evening only  take half of your usual  dose the night  before your procedure              22. If you use a c-pap please bring DOS if staying overnight,             23.For your convenience [facility] has a pharmacy on site to fill your prescriptions. 24. If you use oxygen and have a portable tank please bring it  with you the DOS             25. Bring a complete list of all your medications with name and dose include any supplements. 26. Other__________________________________________   *Please call pre admission testing if you any further questions   Sonia Donaldson   [address]    [place] [phone]. [place]  [phone]   [address] [place] [address]       VISITOR POLICY(subject to change)    Current policy is 2 visitors per patient. No children. A mask is required. Visiting hours are 8a-8p. Overnight visitors will be at the discretion of the nurse. All above information reviewed with patient in person or by phone. Patient verbalizes understanding. All questions and concerns addressed.                                                                                                  Patient/Rep____PATIENT________________                                                                                                                                    PRE OP INSTRUCTIONS

## 2022-10-11 NOTE — H&P
[address]                     [facility], [address]                       PREOPERATIVE HISTORY AND PHYSICAL    PATIENT NAME: Valentín Barber                   :        1940  MED REC NO:   [id number]                          ROOM:  ACCOUNT NO:   [de-identified]                           ADMIT DATE: 10/12/2022  PROVIDER:     Gigi Heimlich, MD    PLANNED DATE OF PROCEDURE: 10/12/2022    PREOPERATIVE DIAGNOSIS:  Right ear squamous cell carcinoma. PLANNED PROCEDURE:  Wide excision, frozen section, and repair using a  full-thickness skin graft. INDICATIONS:  The patient is a 25-year-old white male seen in  consultation for an obvious squamous cell carcinoma of the right ear. After discussing the options at length, he elected to proceed with  definitive excision, frozen section, and repair using a full-thickness  skin graft. PAST MEDICAL HISTORY:  Significant for high blood pressure. He does  have a history blood cancer and bladder cancer. ALLERGIES:  Denies any drug allergies. MEDICATIONS:  Currently takes omeprazole, amlodipine, and a statin. SOCIAL HISTORY:  He is a nonsmoker. PHYSICAL EXAMINATION:  GENERAL:  Reveals a pleasant male, alert, in no apparent distress. VITAL SIGNS:  Stable. LUNGS:  Clear. HEART:  Regular rate and rhythm. SKIN:  Examination again reveals a 1-cm squamous cell carcinoma of the  right ear without any cervical adenopathy. IMPRESSION AND PLAN:  The patient is to undergo definitive wide  excision, frozen section, and repair using a full-thickness skin graft. The issue of bleeding, infection, wound dehiscence, and scarring as well  as the issue of recurrence and total or partial loss of graft itself was  discussed and consent was obtained.         Eric Meraz MD    D: 10/10/2022 13:20:27       T: 10/10/2022 15:55:38     [email or web address]  Job#: [id number]     Doc#: [id number]

## 2022-10-12 ENCOUNTER — HOSPITAL ENCOUNTER (OUTPATIENT)
Age: 82
Setting detail: OUTPATIENT SURGERY
Discharge: HOME | End: 2022-10-12
Attending: PLASTIC SURGERY | Admitting: PLASTIC SURGERY
Payer: MEDICARE

## 2022-10-12 ENCOUNTER — ANESTHESIA (OUTPATIENT)
Dept: OPERATING ROOM | Age: 82
End: 2022-10-12
Payer: MEDICARE

## 2022-10-12 ENCOUNTER — ANESTHESIA EVENT (OUTPATIENT)
Dept: OPERATING ROOM | Age: 82
End: 2022-10-12
Payer: MEDICARE

## 2022-10-12 VITALS
HEART RATE: 62 BPM | BODY MASS INDEX: 30.71 KG/M2 | SYSTOLIC BLOOD PRESSURE: 135 MMHG | DIASTOLIC BLOOD PRESSURE: 62 MMHG | WEIGHT: 247 LBS | OXYGEN SATURATION: 95 % | RESPIRATION RATE: 14 BRPM | HEIGHT: 75 IN | TEMPERATURE: 97.6 F

## 2022-10-12 DIAGNOSIS — C44.222 SQUAMOUS CELL CARCINOMA OF SKIN OF RIGHT EAR AND EXTERNAL AURICULAR CANAL: Primary | ICD-10-CM

## 2022-10-12 DIAGNOSIS — C44.222 SQUAMOUS CELL CARCINOMA OF SKIN OF EAR AND EXTERNAL AUDITORY CANAL, RIGHT: ICD-10-CM

## 2022-10-12 PROCEDURE — 88305 TISSUE EXAM BY PATHOLOGIST: CPT

## 2022-10-12 PROCEDURE — 6360000002 HC RX W HCPCS: Performed by: PLASTIC SURGERY

## 2022-10-12 PROCEDURE — 6360000002 HC RX W HCPCS: Performed by: NURSE ANESTHETIST, CERTIFIED REGISTERED

## 2022-10-12 PROCEDURE — 3600000002 HC SURGERY LEVEL 2 BASE: Performed by: PLASTIC SURGERY

## 2022-10-12 PROCEDURE — 7100000010 HC PHASE II RECOVERY - FIRST 15 MIN: Performed by: PLASTIC SURGERY

## 2022-10-12 PROCEDURE — 7100000011 HC PHASE II RECOVERY - ADDTL 15 MIN: Performed by: PLASTIC SURGERY

## 2022-10-12 PROCEDURE — 2500000003 HC RX 250 WO HCPCS: Performed by: PLASTIC SURGERY

## 2022-10-12 PROCEDURE — 6370000000 HC RX 637 (ALT 250 FOR IP): Performed by: PLASTIC SURGERY

## 2022-10-12 PROCEDURE — 2500000003 HC RX 250 WO HCPCS: Performed by: NURSE ANESTHETIST, CERTIFIED REGISTERED

## 2022-10-12 PROCEDURE — 88331 PATH CONSLTJ SURG 1 BLK 1SPC: CPT

## 2022-10-12 PROCEDURE — 3700000000 HC ANESTHESIA ATTENDED CARE: Performed by: PLASTIC SURGERY

## 2022-10-12 PROCEDURE — 2709999900 HC NON-CHARGEABLE SUPPLY: Performed by: PLASTIC SURGERY

## 2022-10-12 PROCEDURE — 2580000003 HC RX 258: Performed by: PLASTIC SURGERY

## 2022-10-12 PROCEDURE — 3600000012 HC SURGERY LEVEL 2 ADDTL 15MIN: Performed by: PLASTIC SURGERY

## 2022-10-12 PROCEDURE — 3700000001 HC ADD 15 MINUTES (ANESTHESIA): Performed by: PLASTIC SURGERY

## 2022-10-12 RX ORDER — CEPHALEXIN 500 MG/1
500 CAPSULE ORAL 4 TIMES DAILY
Qty: 28 CAPSULE | Refills: 0 | Status: SHIPPED | OUTPATIENT
Start: 2022-10-12 | End: 2022-10-19

## 2022-10-12 RX ORDER — SODIUM CHLORIDE 9 MG/ML
INJECTION, SOLUTION INTRAVENOUS PRN
Status: CANCELLED | OUTPATIENT
Start: 2022-10-12

## 2022-10-12 RX ORDER — ONDANSETRON 2 MG/ML
4 INJECTION INTRAMUSCULAR; INTRAVENOUS
Status: CANCELLED | OUTPATIENT
Start: 2022-10-12 | End: 2022-10-13

## 2022-10-12 RX ORDER — PROPOFOL 10 MG/ML
INJECTION, EMULSION INTRAVENOUS PRN
Status: DISCONTINUED | OUTPATIENT
Start: 2022-10-12 | End: 2022-10-12 | Stop reason: SDUPTHER

## 2022-10-12 RX ORDER — DEXAMETHASONE SODIUM PHOSPHATE 4 MG/ML
INJECTION, SOLUTION INTRA-ARTICULAR; INTRALESIONAL; INTRAMUSCULAR; INTRAVENOUS; SOFT TISSUE PRN
Status: DISCONTINUED | OUTPATIENT
Start: 2022-10-12 | End: 2022-10-12 | Stop reason: SDUPTHER

## 2022-10-12 RX ORDER — KETAMINE HCL IN NACL, ISO-OSM 100MG/10ML
SYRINGE (ML) INJECTION PRN
Status: DISCONTINUED | OUTPATIENT
Start: 2022-10-12 | End: 2022-10-12 | Stop reason: SDUPTHER

## 2022-10-12 RX ORDER — SODIUM CHLORIDE 0.9 % (FLUSH) 0.9 %
5-40 SYRINGE (ML) INJECTION PRN
Status: CANCELLED | OUTPATIENT
Start: 2022-10-12

## 2022-10-12 RX ORDER — MAGNESIUM SULFATE HEPTAHYDRATE 500 MG/ML
INJECTION, SOLUTION INTRAMUSCULAR; INTRAVENOUS PRN
Status: DISCONTINUED | OUTPATIENT
Start: 2022-10-12 | End: 2022-10-12 | Stop reason: SDUPTHER

## 2022-10-12 RX ORDER — GLYCOPYRROLATE 0.2 MG/ML
INJECTION INTRAMUSCULAR; INTRAVENOUS PRN
Status: DISCONTINUED | OUTPATIENT
Start: 2022-10-12 | End: 2022-10-12 | Stop reason: SDUPTHER

## 2022-10-12 RX ORDER — LIDOCAINE HYDROCHLORIDE 10 MG/ML
0.5 INJECTION, SOLUTION EPIDURAL; INFILTRATION; INTRACAUDAL; PERINEURAL ONCE
Status: DISCONTINUED | OUTPATIENT
Start: 2022-10-12 | End: 2022-10-12 | Stop reason: HOSPADM

## 2022-10-12 RX ORDER — HYDROMORPHONE HCL 110MG/55ML
0.25 PATIENT CONTROLLED ANALGESIA SYRINGE INTRAVENOUS EVERY 5 MIN PRN
Status: CANCELLED | OUTPATIENT
Start: 2022-10-12

## 2022-10-12 RX ORDER — MINERAL OIL 471.99 G/472ML
OIL TOPICAL
Status: COMPLETED | OUTPATIENT
Start: 2022-10-12 | End: 2022-10-12

## 2022-10-12 RX ORDER — LIDOCAINE HYDROCHLORIDE 10 MG/ML
INJECTION, SOLUTION INFILTRATION; PERINEURAL PRN
Status: DISCONTINUED | OUTPATIENT
Start: 2022-10-12 | End: 2022-10-12 | Stop reason: SDUPTHER

## 2022-10-12 RX ORDER — HYDROMORPHONE HCL 110MG/55ML
0.5 PATIENT CONTROLLED ANALGESIA SYRINGE INTRAVENOUS EVERY 5 MIN PRN
Status: CANCELLED | OUTPATIENT
Start: 2022-10-12

## 2022-10-12 RX ORDER — FAMOTIDINE 10 MG/ML
INJECTION, SOLUTION INTRAVENOUS PRN
Status: DISCONTINUED | OUTPATIENT
Start: 2022-10-12 | End: 2022-10-12 | Stop reason: SDUPTHER

## 2022-10-12 RX ORDER — SODIUM CHLORIDE 9 MG/ML
INJECTION, SOLUTION INTRAVENOUS CONTINUOUS
Status: DISCONTINUED | OUTPATIENT
Start: 2022-10-12 | End: 2022-10-12 | Stop reason: HOSPADM

## 2022-10-12 RX ORDER — ONDANSETRON 2 MG/ML
INJECTION INTRAMUSCULAR; INTRAVENOUS PRN
Status: DISCONTINUED | OUTPATIENT
Start: 2022-10-12 | End: 2022-10-12 | Stop reason: SDUPTHER

## 2022-10-12 RX ORDER — HYDROCODONE BITARTRATE AND ACETAMINOPHEN 5; 325 MG/1; MG/1
1 TABLET ORAL EVERY 4 HOURS PRN
Qty: 20 TABLET | Refills: 0 | Status: SHIPPED | OUTPATIENT
Start: 2022-10-12 | End: 2022-10-19

## 2022-10-12 RX ORDER — SODIUM CHLORIDE 0.9 % (FLUSH) 0.9 %
5-40 SYRINGE (ML) INJECTION EVERY 12 HOURS SCHEDULED
Status: CANCELLED | OUTPATIENT
Start: 2022-10-12

## 2022-10-12 RX ORDER — LIDOCAINE HYDROCHLORIDE AND EPINEPHRINE 10; 10 MG/ML; UG/ML
INJECTION, SOLUTION INFILTRATION; PERINEURAL
Status: COMPLETED | OUTPATIENT
Start: 2022-10-12 | End: 2022-10-12

## 2022-10-12 RX ORDER — PROPOFOL 10 MG/ML
INJECTION, EMULSION INTRAVENOUS CONTINUOUS PRN
Status: DISCONTINUED | OUTPATIENT
Start: 2022-10-12 | End: 2022-10-12 | Stop reason: SDUPTHER

## 2022-10-12 RX ADMIN — MAGNESIUM SULFATE HEPTAHYDRATE 1 G: 500 INJECTION, SOLUTION INTRAMUSCULAR; INTRAVENOUS at 07:56

## 2022-10-12 RX ADMIN — ONDANSETRON 4 MG: 2 INJECTION INTRAMUSCULAR; INTRAVENOUS at 07:48

## 2022-10-12 RX ADMIN — PROPOFOL 30 MG: 10 INJECTION, EMULSION INTRAVENOUS at 07:45

## 2022-10-12 RX ADMIN — CEFAZOLIN 2000 MG: 2 INJECTION, POWDER, FOR SOLUTION INTRAMUSCULAR; INTRAVENOUS at 07:30

## 2022-10-12 RX ADMIN — DEXAMETHASONE SODIUM PHOSPHATE 4 MG: 4 INJECTION, SOLUTION INTRAMUSCULAR; INTRAVENOUS at 07:46

## 2022-10-12 RX ADMIN — GLYCOPYRROLATE 0.2 MG: 0.2 INJECTION, SOLUTION INTRAMUSCULAR; INTRAVENOUS at 07:43

## 2022-10-12 RX ADMIN — LIDOCAINE HYDROCHLORIDE 100 MG: 10 INJECTION, SOLUTION INFILTRATION; PERINEURAL at 07:45

## 2022-10-12 RX ADMIN — Medication 25 MG: at 07:55

## 2022-10-12 RX ADMIN — FAMOTIDINE 20 MG: 10 INJECTION INTRAVENOUS at 07:36

## 2022-10-12 RX ADMIN — SODIUM CHLORIDE: 9 INJECTION, SOLUTION INTRAVENOUS at 07:11

## 2022-10-12 RX ADMIN — PROPOFOL 30 MG: 10 INJECTION, EMULSION INTRAVENOUS at 07:49

## 2022-10-12 RX ADMIN — PROPOFOL 120 MCG/KG/MIN: 10 INJECTION, EMULSION INTRAVENOUS at 07:46

## 2022-10-12 RX ADMIN — Medication 25 MG: at 07:49

## 2022-10-12 ASSESSMENT — PAIN SCALES - GENERAL
PAINLEVEL_OUTOF10: 3
PAINLEVEL_OUTOF10: 2
PAINLEVEL_OUTOF10: 3

## 2022-10-12 ASSESSMENT — PAIN DESCRIPTION - PAIN TYPE: TYPE: CHRONIC PAIN

## 2022-10-12 ASSESSMENT — PAIN DESCRIPTION - DESCRIPTORS
DESCRIPTORS: ACHING
DESCRIPTORS: SORE

## 2022-10-12 ASSESSMENT — PAIN DESCRIPTION - FREQUENCY: FREQUENCY: INTERMITTENT

## 2022-10-12 ASSESSMENT — PAIN DESCRIPTION - LOCATION: LOCATION: BACK

## 2022-10-12 ASSESSMENT — PAIN - FUNCTIONAL ASSESSMENT
PAIN_FUNCTIONAL_ASSESSMENT: 0-10
PAIN_FUNCTIONAL_ASSESSMENT: ACTIVITIES ARE NOT PREVENTED

## 2022-10-12 ASSESSMENT — PAIN DESCRIPTION - ORIENTATION: ORIENTATION: LOWER

## 2022-10-12 NOTE — ANESTHESIA POSTPROCEDURE EVALUATION
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     Dina Phase I: Dina Score: 10    Dina Phase II:        Anesthesia Post Evaluation    Patient location during evaluation: PACU  Patient participation: complete - patient participated  Level of consciousness: awake  Airway patency: patent  Nausea & Vomiting: no nausea and no vomiting  Complications: no  Cardiovascular status: blood pressure returned to baseline  Respiratory status: acceptable  Hydration status: stable

## 2022-10-12 NOTE — OP NOTE
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  SQUAMOUS CELL CARCINOMA RIGHT EAR - [employer] Tissue Tissue SURGICAL PATHOLOGY Briana Julian MD 10/12/2022 0759    B : Aurora Lucero [facility] [address] [facility], MD 10/12/2022 0825        Implants:  * No implants in log *      Drains: * No LDAs found *    Findings:     Detailed Description of Procedure:       Electronically signed by Briana Julian MD on 10/12/2022 at 8:38 AM

## 2022-10-12 NOTE — PROGRESS NOTES
Instructions reviewed with pt and wife Gerhardt Castleman. Verbalized instructions. Pt d/c'd per w/c. Vss. Pt stable. Accomp by wfe.

## 2022-10-12 NOTE — ANESTHESIA PRE PROCEDURE
Department of Anesthesiology  Preprocedure Note       Name:  Sia Rey   Age:  80 y.o.  :  1940                                          MRN:  [id number]         Date:  10/12/2022      Surgeon: Briana Reed): Tarsha Orellana MD    Procedure: Procedure(s):  EXCISION SQUAMOUS CELL CARCINOMA EAR, FROZEN SECTION  FULL THICKNESS SKIN GRAFT    Medications prior to admission:   Prior to Admission medications    Medication Sig Start Date End Date Taking? Authorizing Provider   amLODIPine (NORVASC) 10 MG tablet Take 10 mg by mouth daily  14   Historical Provider, MD   atorvastatin (LIPITOR) 20 MG tablet Take 10 mg by mouth nightly 14   Historical Provider, MD   omeprazole (PRILOSEC) 20 MG capsule Take 20 mg by mouth 2 times daily  2/5/15   Historical Provider, MD   calcium carbonate 600 MG TABS tablet Take 1 tablet by mouth 2 times daily as needed     Historical Provider, MD   B Complex Vitamins (VITAMIN B COMPLEX PO) Take by mouth daily     Historical Provider, MD       Current medications:    No current facility-administered medications for this encounter.        Allergies:  No Known Allergies    Problem List:    Patient Active Problem List   Diagnosis Code    Peripheral neuropathy G62.9       Past Medical History:        Diagnosis Date    Tracey esophagus     Bladder cancer ([facility] [address]     CKD (chronic kidney disease)     DJD (degenerative joint disease)     HTN (hypertension)     Hyperlipidemia     Neuropathy 2015       Past Surgical History:        Procedure Laterality Date    ANKLE FUSION Right     CYSTOSCOPY      MULTIPLE FOR BLADDER CA.    FACIAL COSMETIC SURGERY Right 2019    EXCISION BASAL CELL CARCINOMA RIGHT CHEEK performed by Tarsha Orellana MD at [facility]      NOSE SURGERY N/A 2019    EXCISION BASAL CELL CARCINOMA FROM NASAL TIP WITH FROZEN SECTION, BILOBED FLAP performed by Tarsha Orellana MD at [address] [place] [address] BIOPSY Right 12/29/2020    WIDE EXCISION SQUAMOUS CELL CARCINOMA RIGHT EAR; FROZEN SECTION, FULL THICKNESS SKINGRAFT performed by Freedom Landry MD at [address] [place] [address]       Social History:    Social History     Tobacco Use    Smoking status: Never    Smokeless tobacco: Never   Substance Use Topics    Alcohol use: No                                Counseling given: Not Answered      Vital Signs (Current):   Vitals:    10/07/22 1112 10/12/22 0651   BP:  (!) 143/63   Pulse:  66   Resp:  16   Temp:  97.6 °F (36.4 °C)   TempSrc:  Temporal   SpO2:  96%   Weight: 241 lb (109.3 kg) 247 lb (112 kg)   Height: 6' 2\" (1.88 m) 6' 3\" (1.905 m)                                              BP Readings from Last 3 Encounters:   10/12/22 (!) 143/63   12/29/20 129/66   12/29/20 (!) 127/56       NPO Status: Time of last liquid consumption: 2230                        Time of last solid consumption: 2100                        Date of last liquid consumption: 10/11/22                        Date of last solid food consumption: 10/11/22    BMI:   Wt Readings from Last 3 Encounters:   10/12/22 247 lb (112 kg)   12/29/20 255 lb (115.7 kg)   11/20/19 248 lb 6 oz (112.7 kg)     Body mass index is 30.87 kg/m². CBC: No results found for: WBC, RBC, HGB, HCT, MCV, RDW, PLT    CMP:   Lab Results   Component Value Date/Time    PROT 6.7 03/06/2015 03:39 PM       POC Tests: No results for input(s): POCGLU, POCNA, POCK, POCCL, POCBUN, POCHEMO, POCHCT in the last 72 hours.     Coags: No results found for: PROTIME, INR, APTT    HCG (If Applicable): No results found for: PREGTESTUR, PREGSERUM, HCG, HCGQUANT     ABGs: No results found for: PHART, PO2ART, PCI3BCE, NFQ4BSH, BEART, R9WZASPG     Type & Screen (If Applicable):  No results found for: LABABO, LABRH    Drug/Infectious Status (If Applicable):  No results found for: HIV, HEPCAB    COVID-19 Screening (If Applicable):   Lab Results   Component Value Date/Time COVID19 NOT DETECTED 12/23/2020 11:22 AM           Anesthesia Evaluation  Patient summary reviewed and Nursing notes reviewed  Airway: Mallampati: II  TM distance: >3 FB   Neck ROM: full  Mouth opening: > = 3 FB   Dental: normal exam         Pulmonary:normal exam  breath sounds clear to auscultation                             Cardiovascular:  Exercise tolerance: good (>4 METS),   (+) hypertension:,         Rhythm: regular  Rate: normal                    Neuro/Psych:   (+) neuromuscular disease:,             GI/Hepatic/Renal: Neg GI/Hepatic/Renal ROS            Endo/Other: Negative Endo/Other ROS                    Abdominal:             Vascular: Other Findings:           Anesthesia Plan      MAC and general     ASA 2       Induction: intravenous. Plan discussed with CRNA.     Attending anesthesiologist reviewed and agrees with Preprocedure content                Freedom Haskins MD   10/12/2022

## 2022-10-12 NOTE — PROGRESS NOTES
Pt received into bay 6 from OR. Pt drowsy, yet oriented. Phase II. O2 at 2L/NC. Resp easy, unlabored. Vss. Pt stable. Report obtained. Right ear dressing clean/dry/intact. C/o chronic back pain. Repositioned.  Will monitor

## 2022-10-12 NOTE — DISCHARGE INSTRUCTIONS
Call Dr. Wayne Herrera for any problems and to schedule follow-up appointment #[phone]   Keep head elevated, dressing dry, f/u 1 wee, apply antibiotic ointment twice a day behind right ear    GENERAL SURGERY DISCHARGE INSTRUCTIONS    Follow your surgeons instructions. Follow up with your surgeon as directed. Observe the operative area for signs of excessive bleeding. If needed apply pressure,elevate if able and contact your surgeon. Observe the operative site for any signs of infection- such as increased pain,redness,fever greater than 101 degrees,swelling, foul odor or drainage. Contact your surgeon if any of these symptoms are present. Keep operative site clean and dry. Do not remove dressing unless instructed to by surgeon. If unable to urinate once you are at home,  notify your surgeon or go to the Emergency Room. Avoid pulling,pushing or tugging to suture line. If you become short of breath call your doctor or go to the ER. Take medications as directed. Pain medication should be taken with food. Do not drive or operate machinery while taking narcotics. For any problems or question call your surgeon. ANESTHESIA DISCHARGE INSTRUCTIONS    Wear your seatbelt home. You are under the influence of drugs-do not drink alcohol, drive, operate machinery, make any important decisions or sign any legal documents for 24 hours. A responsible adult needs to be with you for 24 hours. You may experience lightheadedness, dizziness, or sleepiness following surgery. Rest at home today- increase activity as tolerated. Progress slowly to a regular diet unless your physician has instructed you otherwise. Drink plenty of water. If persistent nausea and vomiting becomes a problem, call your physician. Coughing, sore throat and muscle aches are other side effects of anesthesia, and should improve with time. Do not drive or operate machinery while taking narcotics.

## 2022-10-12 NOTE — OP NOTE
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 POSTOPERATIVE DIAGNOSIS:  Squamous cell carcinoma of right posterior  ear, superior third 1.3 cm. OPERATION PERFORMED:  Wide excision, frozen section and repair using a  full-thickness skin graft. SURGEON:  Carol Capps MD    ANESTHESIA:  Local 1% lidocaine with epinephrine with IV sedation. BLOOD LOSS:  None. INDICATIONS:  An 55-year-old male who presented with an obvious squamous  cell carcinoma of the right superior posterior ear. After discussing  the options at length, elected to proceed with definitive excision,  frozen section and repair using full-thickness skin graft. The issue of  bleeding, infection, wound dehiscence, scarring as well as the issue of  recurrence and total or partial loss of graft itself was discussed and  consent was obtained. OPERATIVE PROCEDURE:  The patient was taken to the operating room on  10/12 and placed in supine position, at which time IV sedation provided. The area was infiltrated with 1% lidocaine with epinephrine. The area  was then sterilely prepped and draped in the usual fashion using  Betadine solution. Lesion was then excised. Frozen section confirmed  diagnosis with some deep positive margins from 3 and 9.  ______  remaining deep tissue just above the perichondrium. A full-thickness  skin grafts were then obtained from the postauricular area. After  hemostasis donor site was closed with 5-0 chromic.     Skin graft was appropriately defatted, placed on the recipient site  using 5-0 Vicryl. A bolster dressing made of Xeroform, cotton ball  soaked in mineral oil was then placed and secured with a 5-0 Vicryl. Antibiotic ointment was placed in the postauricular area. INSTRUCTIONS:  Keep the head elevated at all times. Follow up in one  week.   He did get a script for Keflex as well as Mai Lenz MD    D: 10/12/2022 9:23:23       T: 10/12/2022 19:01:47     [email or web address]  Job#: [id number]     Doc#: [id number]    CC:

## 2022-10-12 NOTE — H&P
I have reviewed the history and physical and examined the patient and find no relevant changes. I have reviewed with the patient and/or family the risks, benefits, and alternatives to the procedure. Patient has no known allergies. Last recorded vitals:  BP (!) 143/63   Pulse 66   Temp 97.6 °F (36.4 °C) (Temporal)   Resp 16   Ht 6' 3\" (1.905 m)   Wt 247 lb (112 kg)   SpO2 96%   BMI 30.87 kg/m²     Past Surgical History:   Procedure Laterality Date    ANKLE FUSION Right 2012    CYSTOSCOPY      MULTIPLE FOR BLADDER CA. FACIAL COSMETIC SURGERY Right 11/20/2019    EXCISION BASAL CELL CARCINOMA RIGHT CHEEK performed by Andrei Coffman MD at [address] [place] [address]      NOSE SURGERY N/A 11/20/2019    EXCISION BASAL CELL CARCINOMA FROM NASAL TIP WITH FROZEN SECTION, BILOBED FLAP performed by Andrei Coffman MD at [facility] Right 12/29/2020    WIDE EXCISION SQUAMOUS CELL CARCINOMA RIGHT EAR; FROZEN SECTION, FULL THICKNESS SKINGRAFT performed by Andrei Coffman MD at [facility]       Prior to Admission medications    Medication Sig Start Date End Date Taking?  Authorizing Provider   amLODIPine (NORVASC) 10 MG tablet Take 10 mg by mouth daily  12/26/14   Historical Provider, MD   atorvastatin (LIPITOR) 20 MG tablet Take 10 mg by mouth nightly 12/12/14   Historical Provider, MD   omeprazole (PRILOSEC) 20 MG capsule Take 20 mg by mouth 2 times daily  2/5/15   Historical Provider, MD   calcium carbonate 600 MG TABS tablet Take 1 tablet by mouth 2 times daily as needed     Historical Provider, MD   B Complex Vitamins (VITAMIN B COMPLEX PO) Take by mouth daily     Historical Provider, MD         Current Facility-Administered Medications:     lidocaine PF 1 % injection 0.5 mL, 0.5 mL, IntraDERmal, Once, Andrei Coffman MD    0.9 % sodium chloride infusion, , IntraVENous, Continuous, Armaan Mendoza MD, Last Rate: 50 mL/hr at 10/12/22 0724, [facility] at 10/12/22 0724    ceFAZolin (ANCEF) 2,000 mg in dextrose 5 % 50 mL IVPB (mini-bag), 2,000 mg, IntraVENous, On Call to MD Dotty Richardson MD  10/12/2022

## 2024-03-29 NOTE — H&P
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    PLANNED PROCEDURE:  Wide excision, frozen section, repair using full-thickness skin graft.    INDICATION:  The patient is an 84-year-old male who presented with the above-noted pathology.  We once again discussed the option at length, and elected to proceed with definitive excision, frozen section, repair using full-thickness skin graft.  Issue of bleeding, infection, wound dehiscence, scarring as well as the issue of recurrence and total or partial loss of graft itself discussed and consent was obtained.    MEDICAL HISTORY:  Significant for high blood pressure only.  Denies any cardiac or pulmonary.  Does have a history of bladder carcinoma.    ALLERGIES:  DENIES ANY DRUG ALLERGIES.      MEDICATIONS:  Currently, he is on omeprazole, amlodipine, and a statin.    SOCIAL HISTORY:  He is a nonsmoker.    PHYSICAL EXAMINATION:  GENERAL:  Pleasant, alert male in no apparent distress.  VITAL SIGNS:  Good.  LUNGS:  Clear.  HEART: Regular rate and rhythm.  NECK:  Examination again reveals the ulcerated obvious squamous cell carcinoma of the right postauricular area, again measuring 2.5 cm.  No cervical adenopathy noted.    IMPRESSION AND PLAN:  Again, the patient is to undergo wide excision, frozen section, repair using a full-thickness skin graft.          ARMAAN MENDOZA MD      D:  2024 08:56:21     T:  2024 12:07:14     [facility]  Job #:  [id number]     Doc#:  [id number]

## 2024-04-03 ENCOUNTER — ANESTHESIA EVENT (OUTPATIENT)
Dept: OPERATING ROOM | Age: 84
End: 2024-04-03
Payer: MEDICARE

## 2024-04-03 ENCOUNTER — HOSPITAL ENCOUNTER (OUTPATIENT)
Age: 84
Setting detail: OUTPATIENT SURGERY
Discharge: HOME | End: 2024-04-03
Attending: PLASTIC SURGERY | Admitting: PLASTIC SURGERY
Payer: MEDICARE

## 2024-04-03 ENCOUNTER — ANESTHESIA (OUTPATIENT)
Dept: OPERATING ROOM | Age: 84
End: 2024-04-03
Payer: MEDICARE

## 2024-04-03 VITALS
RESPIRATION RATE: 16 BRPM | HEART RATE: 57 BPM | SYSTOLIC BLOOD PRESSURE: 127 MMHG | WEIGHT: 245.6 LBS | HEIGHT: 73 IN | OXYGEN SATURATION: 96 % | TEMPERATURE: 97.4 F | DIASTOLIC BLOOD PRESSURE: 60 MMHG | BODY MASS INDEX: 32.55 KG/M2

## 2024-04-03 DIAGNOSIS — C44.222 SQUAMOUS CELL CARCINOMA OF SKIN OF RIGHT EAR AND EXTERNAL AURICULAR CANAL: Primary | ICD-10-CM

## 2024-04-03 PROCEDURE — 3600000012 HC SURGERY LEVEL 2 ADDTL 15MIN: Performed by: PLASTIC SURGERY

## 2024-04-03 PROCEDURE — 6360000002 HC RX W HCPCS: Performed by: PLASTIC SURGERY

## 2024-04-03 PROCEDURE — 6360000002 HC RX W HCPCS: Performed by: NURSE ANESTHETIST, CERTIFIED REGISTERED

## 2024-04-03 PROCEDURE — 88305 TISSUE EXAM BY PATHOLOGIST: CPT

## 2024-04-03 PROCEDURE — 6370000000 HC RX 637 (ALT 250 FOR IP): Performed by: PLASTIC SURGERY

## 2024-04-03 PROCEDURE — A4216 STERILE WATER/SALINE, 10 ML: HCPCS | Performed by: STUDENT IN AN ORGANIZED HEALTH CARE EDUCATION/TRAINING PROGRAM

## 2024-04-03 PROCEDURE — 3600000002 HC SURGERY LEVEL 2 BASE: Performed by: PLASTIC SURGERY

## 2024-04-03 PROCEDURE — 2709999900 HC NON-CHARGEABLE SUPPLY: Performed by: PLASTIC SURGERY

## 2024-04-03 PROCEDURE — 7100000001 HC PACU RECOVERY - ADDTL 15 MIN: Performed by: PLASTIC SURGERY

## 2024-04-03 PROCEDURE — 7100000010 HC PHASE II RECOVERY - FIRST 15 MIN: Performed by: PLASTIC SURGERY

## 2024-04-03 PROCEDURE — 88332 PATH CONSLTJ SURG EA ADD BLK: CPT

## 2024-04-03 PROCEDURE — 6370000000 HC RX 637 (ALT 250 FOR IP): Performed by: STUDENT IN AN ORGANIZED HEALTH CARE EDUCATION/TRAINING PROGRAM

## 2024-04-03 PROCEDURE — 3700000000 HC ANESTHESIA ATTENDED CARE: Performed by: PLASTIC SURGERY

## 2024-04-03 PROCEDURE — 2500000003 HC RX 250 WO HCPCS: Performed by: NURSE ANESTHETIST, CERTIFIED REGISTERED

## 2024-04-03 PROCEDURE — 2580000003 HC RX 258: Performed by: STUDENT IN AN ORGANIZED HEALTH CARE EDUCATION/TRAINING PROGRAM

## 2024-04-03 PROCEDURE — 88331 PATH CONSLTJ SURG 1 BLK 1SPC: CPT

## 2024-04-03 PROCEDURE — 7100000011 HC PHASE II RECOVERY - ADDTL 15 MIN: Performed by: PLASTIC SURGERY

## 2024-04-03 PROCEDURE — 2500000003 HC RX 250 WO HCPCS: Performed by: STUDENT IN AN ORGANIZED HEALTH CARE EDUCATION/TRAINING PROGRAM

## 2024-04-03 PROCEDURE — 7100000000 HC PACU RECOVERY - FIRST 15 MIN: Performed by: PLASTIC SURGERY

## 2024-04-03 PROCEDURE — 2500000003 HC RX 250 WO HCPCS: Performed by: PLASTIC SURGERY

## 2024-04-03 PROCEDURE — 3700000001 HC ADD 15 MINUTES (ANESTHESIA): Performed by: PLASTIC SURGERY

## 2024-04-03 PROCEDURE — 2580000003 HC RX 258: Performed by: PLASTIC SURGERY

## 2024-04-03 PROCEDURE — 6360000002 HC RX W HCPCS: Performed by: STUDENT IN AN ORGANIZED HEALTH CARE EDUCATION/TRAINING PROGRAM

## 2024-04-03 RX ORDER — SODIUM CHLORIDE 9 MG/ML
INJECTION, SOLUTION INTRAVENOUS PRN
Status: DISCONTINUED | OUTPATIENT
Start: 2024-04-03 | End: 2024-04-03 | Stop reason: HOSPADM

## 2024-04-03 RX ORDER — ONDANSETRON 2 MG/ML
4 INJECTION INTRAMUSCULAR; INTRAVENOUS
Status: DISCONTINUED | OUTPATIENT
Start: 2024-04-03 | End: 2024-04-03 | Stop reason: HOSPADM

## 2024-04-03 RX ORDER — SODIUM CHLORIDE 0.9 % (FLUSH) 0.9 %
5-40 SYRINGE (ML) INJECTION PRN
Status: DISCONTINUED | OUTPATIENT
Start: 2024-04-03 | End: 2024-04-03 | Stop reason: HOSPADM

## 2024-04-03 RX ORDER — FENTANYL CITRATE 50 UG/ML
INJECTION, SOLUTION INTRAMUSCULAR; INTRAVENOUS PRN
Status: DISCONTINUED | OUTPATIENT
Start: 2024-04-03 | End: 2024-04-03 | Stop reason: SDUPTHER

## 2024-04-03 RX ORDER — SODIUM CHLORIDE 0.9 % (FLUSH) 0.9 %
5-40 SYRINGE (ML) INJECTION EVERY 12 HOURS SCHEDULED
Status: DISCONTINUED | OUTPATIENT
Start: 2024-04-03 | End: 2024-04-03 | Stop reason: HOSPADM

## 2024-04-03 RX ORDER — CEPHALEXIN 500 MG/1
500 CAPSULE ORAL 4 TIMES DAILY
Qty: 28 CAPSULE | Refills: 0 | Status: SHIPPED | OUTPATIENT
Start: 2024-04-03 | End: 2024-04-10

## 2024-04-03 RX ORDER — DEXMEDETOMIDINE HYDROCHLORIDE 100 UG/ML
INJECTION, SOLUTION INTRAVENOUS PRN
Status: DISCONTINUED | OUTPATIENT
Start: 2024-04-03 | End: 2024-04-03 | Stop reason: SDUPTHER

## 2024-04-03 RX ORDER — APREPITANT 40 MG/1
40 CAPSULE ORAL ONCE
Status: COMPLETED | OUTPATIENT
Start: 2024-04-03 | End: 2024-04-03

## 2024-04-03 RX ORDER — ACETAMINOPHEN 325 MG/1
650 TABLET ORAL ONCE
Status: COMPLETED | OUTPATIENT
Start: 2024-04-03 | End: 2024-04-03

## 2024-04-03 RX ORDER — HYDROMORPHONE HYDROCHLORIDE 2 MG/ML
0.5 INJECTION, SOLUTION INTRAMUSCULAR; INTRAVENOUS; SUBCUTANEOUS EVERY 5 MIN PRN
Status: DISCONTINUED | OUTPATIENT
Start: 2024-04-03 | End: 2024-04-03 | Stop reason: HOSPADM

## 2024-04-03 RX ORDER — SODIUM CHLORIDE, SODIUM LACTATE, POTASSIUM CHLORIDE, CALCIUM CHLORIDE 600; 310; 30; 20 MG/100ML; MG/100ML; MG/100ML; MG/100ML
INJECTION, SOLUTION INTRAVENOUS CONTINUOUS
Status: DISCONTINUED | OUTPATIENT
Start: 2024-04-03 | End: 2024-04-03

## 2024-04-03 RX ORDER — SODIUM CHLORIDE, SODIUM LACTATE, POTASSIUM CHLORIDE, CALCIUM CHLORIDE 600; 310; 30; 20 MG/100ML; MG/100ML; MG/100ML; MG/100ML
INJECTION, SOLUTION INTRAVENOUS CONTINUOUS
Status: DISCONTINUED | OUTPATIENT
Start: 2024-04-03 | End: 2024-04-03 | Stop reason: HOSPADM

## 2024-04-03 RX ORDER — PROPOFOL 10 MG/ML
INJECTION, EMULSION INTRAVENOUS PRN
Status: DISCONTINUED | OUTPATIENT
Start: 2024-04-03 | End: 2024-04-03 | Stop reason: SDUPTHER

## 2024-04-03 RX ORDER — OXYCODONE HYDROCHLORIDE 5 MG/1
5 TABLET ORAL
Status: DISCONTINUED | OUTPATIENT
Start: 2024-04-03 | End: 2024-04-03 | Stop reason: HOSPADM

## 2024-04-03 RX ORDER — NALOXONE HYDROCHLORIDE 0.4 MG/ML
INJECTION, SOLUTION INTRAMUSCULAR; INTRAVENOUS; SUBCUTANEOUS PRN
Status: DISCONTINUED | OUTPATIENT
Start: 2024-04-03 | End: 2024-04-03 | Stop reason: HOSPADM

## 2024-04-03 RX ORDER — MINERAL OIL 471.99 G/472ML
OIL TOPICAL
Status: COMPLETED | OUTPATIENT
Start: 2024-04-03 | End: 2024-04-03

## 2024-04-03 RX ORDER — HYDROCODONE BITARTRATE AND ACETAMINOPHEN 5; 325 MG/1; MG/1
1 TABLET ORAL EVERY 4 HOURS PRN
Qty: 25 TABLET | Refills: 0 | Status: SHIPPED | OUTPATIENT
Start: 2024-04-03 | End: 2024-04-10

## 2024-04-03 RX ORDER — LIDOCAINE HYDROCHLORIDE 20 MG/ML
INJECTION, SOLUTION INFILTRATION; PERINEURAL PRN
Status: DISCONTINUED | OUTPATIENT
Start: 2024-04-03 | End: 2024-04-03 | Stop reason: SDUPTHER

## 2024-04-03 RX ORDER — FENTANYL CITRATE 50 UG/ML
50 INJECTION, SOLUTION INTRAMUSCULAR; INTRAVENOUS EVERY 5 MIN PRN
Status: DISCONTINUED | OUTPATIENT
Start: 2024-04-03 | End: 2024-04-03 | Stop reason: HOSPADM

## 2024-04-03 RX ORDER — LIDOCAINE HYDROCHLORIDE 10 MG/ML
0.5 INJECTION, SOLUTION EPIDURAL; INFILTRATION; INTRACAUDAL; PERINEURAL ONCE
Status: DISCONTINUED | OUTPATIENT
Start: 2024-04-03 | End: 2024-04-03 | Stop reason: HOSPADM

## 2024-04-03 RX ADMIN — FENTANYL CITRATE 25 MCG: 50 INJECTION, SOLUTION INTRAMUSCULAR; INTRAVENOUS at 09:32

## 2024-04-03 RX ADMIN — SODIUM CHLORIDE: 9 INJECTION, SOLUTION INTRAVENOUS at 08:13

## 2024-04-03 RX ADMIN — ACETAMINOPHEN 650 MG: 325 TABLET ORAL at 08:15

## 2024-04-03 RX ADMIN — APREPITANT 40 MG: 40 CAPSULE ORAL at 08:15

## 2024-04-03 RX ADMIN — PROPOFOL 50 MG: 10 INJECTION, EMULSION INTRAVENOUS at 09:37

## 2024-04-03 RX ADMIN — PROPOFOL 30 MCG/KG/MIN: 10 INJECTION, EMULSION INTRAVENOUS at 09:45

## 2024-04-03 RX ADMIN — PROPOFOL 50 MG: 10 INJECTION, EMULSION INTRAVENOUS at 09:36

## 2024-04-03 RX ADMIN — LIDOCAINE HYDROCHLORIDE 60 MG: 20 INJECTION, SOLUTION INFILTRATION; PERINEURAL at 09:36

## 2024-04-03 RX ADMIN — FENTANYL CITRATE 25 MCG: 50 INJECTION, SOLUTION INTRAMUSCULAR; INTRAVENOUS at 09:35

## 2024-04-03 RX ADMIN — CEFAZOLIN 2000 MG: 2 INJECTION, POWDER, FOR SOLUTION INTRAMUSCULAR; INTRAVENOUS at 09:29

## 2024-04-03 RX ADMIN — DEXMEDETOMIDINE HYDROCHLORIDE 4 MCG: 100 INJECTION, SOLUTION INTRAVENOUS at 09:35

## 2024-04-03 RX ADMIN — DEXMEDETOMIDINE HYDROCHLORIDE 4 MCG: 100 INJECTION, SOLUTION INTRAVENOUS at 09:32

## 2024-04-03 RX ADMIN — FAMOTIDINE 20 MG: 10 INJECTION, SOLUTION INTRAVENOUS at 08:49

## 2024-04-03 ASSESSMENT — PAIN - FUNCTIONAL ASSESSMENT: PAIN_FUNCTIONAL_ASSESSMENT: 0-10

## 2024-04-03 ASSESSMENT — ENCOUNTER SYMPTOMS: SHORTNESS OF BREATH: 0

## 2024-04-03 NOTE — ANESTHESIA PRE PROCEDURE
Department of Anesthesiology  Preprocedure Note       Name:  Von Agarwal   Age:  84 y.o.  :  1940                                          MRN:  [id number]         Date:  4/3/2024      Surgeon: Surgeon(s):  Armaan Mendoza MD    Procedure: Procedure(s):  EXCISION SQUAMOUS CELL CARCINOMA RIGHT POSTERIOR EAR FROZEN SECTION  FULL THICKNESS SKIN GRAFT    Medications prior to admission:   Prior to Admission medications    Medication Sig Start Date End Date Taking? Authorizing Provider   amLODIPine (NORVASC) 10 MG tablet Take 1 tablet by mouth daily 14   Babita Pablo MD   atorvastatin (LIPITOR) 20 MG tablet Take 0.5 tablets by mouth nightly 14   Babita Pablo MD   omeprazole (PRILOSEC) 20 MG capsule Take 1 capsule by mouth 2 times daily 2/5/15   Babita Pablo MD   calcium carbonate 600 MG TABS tablet Take 1 tablet by mouth 2 times daily as needed    Babita Pablo MD   B Complex Vitamins (VITAMIN B COMPLEX PO) Take by mouth daily     Babita Pablo MD       Current medications:    Current Facility-Administered Medications   Medication Dose Route Frequency Provider Last Rate Last Admin   • ceFAZolin (ANCEF) 2,000 mg in sodium chloride 0.9 % 50 mL IVPB (mini-bag)  2,000 mg IntraVENous On Call to OR Armaan Mendoza MD       • lactated ringers IV soln infusion   IntraVENous Continuous Armaan Mendoza MD       • lidocaine PF 1 % injection 0.5 mL  0.5 mL IntraDERmal Once Armaan Mendoza MD       • acetaminophen (TYLENOL) tablet 650 mg  650 mg Oral Once Soha Sanders MD       • lactated ringers IV soln infusion   IntraVENous Continuous Soha Sanders MD       • sodium chloride flush 0.9 % injection 5-40 mL  5-40 mL IntraVENous 2 times per day Soha Sanders MD       • sodium chloride flush 0.9 % injection 5-40 mL  5-40 mL IntraVENous PRN Soha Sanders MD       • 0.9 % sodium chloride infusion   IntraVENous PRN Soha Sanders  mL/hr at 24 0813 New Bag

## 2024-04-03 NOTE — H&P
I have reviewed the history and physical and examined the patient and find no relevant changes.    I have reviewed with the patient and/or family the risks, benefits, and alternatives to the procedure.    Patient has no known allergies.    Last recorded vitals:  BP (!) 164/66   Pulse 67   Temp 98.2 °F (36.8 °C) (Temporal)   Resp 16   Ht 1.854 m (6' 1\")   Wt 111.4 kg (245 lb 9.6 oz)   SpO2 97%   BMI 32.40 kg/m²     Past Surgical History:   Procedure Laterality Date    ANKLE FUSION Right 2012    CYSTOSCOPY      MULTIPLE FOR BLADDER CA.    FACIAL COSMETIC SURGERY Right 11/20/2019    EXCISION BASAL CELL CARCINOMA RIGHT CHEEK performed by Armaan Mendoza MD at [facility] OR    FRACTURE SURGERY      HEMORRHOID SURGERY      NOSE SURGERY N/A 11/20/2019    EXCISION BASAL CELL CARCINOMA FROM NASAL TIP WITH FROZEN SECTION, BILOBED FLAP performed by Armaan Mendoza MD at [facility] OR    SKIN BIOPSY Right 12/29/2020    WIDE EXCISION SQUAMOUS CELL CARCINOMA RIGHT EAR; FROZEN SECTION, FULL THICKNESS SKINGRAFT performed by Armaan Mendoza MD at [facility] OR    SKIN BIOPSY Right 10/12/2022    EXCISION SQUAMOUS CELL CARCINOMA EAR, FROZEN SECTION performed by Armaan Mendoza MD at [facility] OR    SKIN GRAFT Right 10/12/2022    FULL THICKNESS SKIN GRAFT performed by Armaan Mendoza MD at [facility] OR    TONSILLECTOMY      CHILDHOOD       Prior to Admission medications    Medication Sig Start Date End Date Taking? Authorizing Provider   amLODIPine (NORVASC) 10 MG tablet Take 1 tablet by mouth daily 12/26/14   Babita Pablo MD   atorvastatin (LIPITOR) 20 MG tablet Take 0.5 tablets by mouth nightly 12/12/14   Babita Pablo MD   omeprazole (PRILOSEC) 20 MG capsule Take 1 capsule by mouth 2 times daily 2/5/15   Babita Pablo MD   calcium carbonate 600 MG TABS tablet Take 1 tablet by mouth 2 times daily as needed    Babita Pablo MD   B Complex Vitamins (VITAMIN B COMPLEX PO) Take by mouth daily     Babita Pablo MD

## 2024-04-03 NOTE — ANESTHESIA POSTPROCEDURE EVALUATION
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 [C44.222])    Surgeons: Armaan Mendoza MD Responsible Provider: Soha Sanders MD    Anesthesia Type: MAC ASA Status: 3            Anesthesia Type: MAC    Dina Phase I: Dina Score: 10    Dina Phase II: Dina Score: 10    Anesthesia Post Evaluation    Patient location during evaluation: bedside  Patient participation: complete - patient participated  Level of consciousness: awake and alert  Pain score: 2  Airway patency: patent  Nausea & Vomiting: no vomiting  Cardiovascular status: hemodynamically stable  Respiratory status: nonlabored ventilation  Hydration status: stable  Multimodal analgesia pain management approach  Pain management: adequate    No notable events documented.

## 2024-04-03 NOTE — PROGRESS NOTES
Discharge instructions review with patient and pt wife bedside.  Pt discharged via wheelchair. Pt discharged with instructions and all belongings. Pt wife taking stable pt home.     
FROZEN SPECIMEN WALKED OVER TO PATHOLOGY PER JM @ 1000  
Pt arrived from OR to PACU bay 4. Reported received from OR RN/[facility] staff. Pt  arousable to voice. Surgical incisions dressings in place to right ear with scant bloody drainage note, neck CDI. Pt on RA, SB, and VSS. Will continue to monitor.    
Pt awake and alert at this time. Pt on RA, and VSS. Pt denies pain and nausea, tolerating PO. Site to neck DCI, no new drainage noted to ear. Pt meets criteria to be discharged from Phase 1.      
Teaching / education initiated regarding perioperative experience, expectations, and pain management during stay. Patient verbalized understanding.    
to leave alone or drive yourself home.  It is strongly suggested someone stay with you the first 24 hrs. Your surgery will be cancelled if you do not have a ride home.   8. A parent/legal guardian must accompany a child scheduled for surgery and plan to stay at the hospital until the child is discharged.  Please do not bring other children with you.   9. Please wear simple, loose fitting clothing to the hospital.  Do not bring valuables (money, credit cards, checkbooks, etc.) Do not wear any makeup (including no eye makeup) or nail polish on your fingers or toes.             10. DO NOT wear any jewelry or piercings on day of surgery.  All body piercing jewelry must be removed.             11. If you have ___dentures, they will be removed before going to the OR; we will provide you a container.  If you wear ___contact lenses or ___glasses, they will be removed; please bring a case for them.             12. Please see your family doctor/pediatrician for a history & physical and/or concerning medications.  Bring any test results/reports from your physician's office.   PCP__________________Phone___________H&P Appt. Date________             13 If you  have a Living Will and Durable Power of  for Healthcare, please bring in a copy.             14. Notify your Surgeon if you develop any illness between now and surgery  time, cough, cold, fever, sore throat, nausea, vomiting, etc.  Please notify your surgeon if you experience dizziness, shortness of breath or blurred vision between now & the time of your surgery             15. DO NOT shave your operative site 96 hours prior to surgery. For face & neck surgery, men may use an electric razor 48 hours prior to surgery.             16. Shower the night before or morning of surgery using an antibacterial soap or as you have been instructed.             17. To provide excellent care visitors will be limited to one in the room at any given time.             18.  Please

## 2024-04-03 NOTE — OP NOTE
Vicryl.  A bolster dressing made of Xeroform, cotton ball soaked in mineral oil was then placed, secured with the 4-0 Vicryl.  He was then taken to recovery room in satisfactory condition.  Instructed to keep the head elevated at all times.  Follow up in 1 week.  Dressings to remain dry.  I did give him a script for Norco as well as Keflex.          TEODORO WELCH MD      D:  04/03/2024 12:35:49     T:  04/03/2024 17:30:55     [facility]  Job #:  [id number]     Doc#:  [id number]

## (undated) DEVICE — DRESSING,GAUZE,XEROFORM,CURAD,1"X8",ST: Brand: CURAD

## (undated) DEVICE — PACK PROCEDURE SURG EXTREMITY MFFOP CUST

## (undated) DEVICE — GAUZE,SPONGE,4"X4",16PLY,XRAY,STRL,LF: Brand: MEDLINE

## (undated) DEVICE — 3M™ TEGADERM™ TRANSPARENT FILM DRESSING FRAME STYLE, 1626W, 4 IN X 4-3/4 IN (10 CM X 12 CM), 50/CT 4CT/CASE: Brand: 3M™ TEGADERM™

## (undated) DEVICE — STANDARD HYPODERMIC NEEDLE,ALUMINUM HUB: Brand: MONOJECT

## (undated) DEVICE — MEDICINE CUP, GRADUATED, STER: Brand: MEDLINE

## (undated) DEVICE — DRESSING,GAUZE,XEROFORM,CURAD,5"X9",ST: Brand: CURAD

## (undated) DEVICE — GAUZE,SPONGE,4"X4",8PLY,STRL,LF,10/TRAY: Brand: MEDLINE

## (undated) DEVICE — Z DISCONTINUED NO SUB IDED SUTURE ETHLN SZ 5-0 L18IN NONABSORBABLE BLK L13MM PC-1 3/8

## (undated) DEVICE — SUTURE VIC + ABS BR UD RB1 4-0 18IN VCP714D

## (undated) DEVICE — TOWEL,OR,DSP,ST,BLUE,STD,4/PK,20PK/CS: Brand: MEDLINE

## (undated) DEVICE — SUTURE VCRL + SZ 4-0  L18IN RB 1  CR ABSRB VCP714D

## (undated) DEVICE — MASC TURNOVER KIT: Brand: MEDLINE INDUSTRIES, INC.

## (undated) DEVICE — SOLUTION IV IRRIG 500ML 0.9% SODIUM CHL 2F7123

## (undated) DEVICE — STERILE COTTON BALLS LARGE 5/P: Brand: MEDLINE

## (undated) DEVICE — NEEDLE HYPO 30GA L1IN BGE POLYPR HUB S STL REG BVL STR W/O

## (undated) DEVICE — SUTURE VCRL + SZ 3-0 L18IN ABSRB UD PS-2 3/8 CIR REV CUT VCP497H

## (undated) DEVICE — HYPODERMIC SAFETY NEEDLE: Brand: MAGELLAN

## (undated) DEVICE — DRAPE EENT SPLIT

## (undated) DEVICE — INTENDED FOR TISSUE SEPARATION, AND OTHER PROCEDURES THAT REQUIRE A SHARP SURGICAL BLADE TO PUNCTURE OR CUT.: Brand: BARD-PARKER ® STAINLESS STEEL BLADES

## (undated) DEVICE — SUTURE VCRL + SZ 5-0 L18IN ABSRB UD P-3 3/8 CIR REV CUT NDL VCP493G

## (undated) DEVICE — SUTURE NONABSORBABLE MONOFILAMENT 6-0 PC-1 18 IN ETHILON 1856G

## (undated) DEVICE — SUTURE CHROMIC GUT SZ 5-0 L18IN ABSRB BRN PS-4 L16MM 1/2 1642G

## (undated) DEVICE — SUTURE ETHLN SZ 6-0 L18IN NONABSORBABLE BLK L16MM PS-3 3/8 1665G

## (undated) DEVICE — PAD N ADH W3XL4IN POLY COT SFT PERF FLM EASILY CUT ABSRB

## (undated) DEVICE — SUTURE VCRL SZ 5-0 L18IN ABSRB UD L13MM P-3 3/8 CIR PRIM J493G

## (undated) DEVICE — MASTISOL ADHESIVE LIQ 2/3ML

## (undated) DEVICE — SUTURE ETHLN SZ 6-0 L18IN NONABSORBABLE BLK L11MM P-1 3/8 697G

## (undated) DEVICE — SYRINGE MED 5ML STD CLR PLAS LUERLOCK TIP N CTRL DISP

## (undated) DEVICE — STRIP,CLOSURE,WOUND,MEDI-STRIP,1/2X4: Brand: MEDLINE

## (undated) DEVICE — 3M™ STERI-STRIP™ REINFORCED ADHESIVE SKIN CLOSURES, R1546, 1/4 IN X 4 IN (6 MM X 100 MM), 10 STRIPS/ENVELOPE: Brand: 3M™ STERI-STRIP™

## (undated) DEVICE — SUTURE VCRL SZ 5-0 L18IN ABSRB UD P-3 L13MM 3/8 CIR PRIM J493H

## (undated) DEVICE — SYRINGE, LUER LOCK, 10ML: Brand: MEDLINE

## (undated) DEVICE — GLOVE SURG SZ 65 THK91MIL LTX FREE SYN POLYISOPRENE

## (undated) DEVICE — Device

## (undated) DEVICE — SOLUTION IRRIG 500ML 0.9% SOD CHL USP POUR PLAS BTL

## (undated) DEVICE — 6 ML SYRINGE LUER-LOCK TIP: Brand: MONOJECT

## (undated) DEVICE — SUTURE CHROMIC GUT SZ 5-0 L18IN ABSRB BRN P-3 L13MM 3/8 CIR 687G

## (undated) DEVICE — SUTURE ETHLN SZ 5-0 L18IN NONABSORBABLE BLK L16MM PC-3 3/8 1865G

## (undated) DEVICE — ELECTRODE PT RET AD L9FT HI MOIST COND ADH HYDRGEL CORDED

## (undated) DEVICE — STANDARD HYPODERMIC NEEDLE,POLYPROPYLENE HUB: Brand: MONOJECT